# Patient Record
Sex: FEMALE | Race: WHITE | NOT HISPANIC OR LATINO | Employment: OTHER | URBAN - METROPOLITAN AREA
[De-identification: names, ages, dates, MRNs, and addresses within clinical notes are randomized per-mention and may not be internally consistent; named-entity substitution may affect disease eponyms.]

---

## 2024-05-01 ENCOUNTER — OFFICE VISIT (OUTPATIENT)
Dept: FAMILY MEDICINE | Facility: CLINIC | Age: 69
End: 2024-05-01
Payer: MEDICARE

## 2024-05-01 VITALS
SYSTOLIC BLOOD PRESSURE: 110 MMHG | BODY MASS INDEX: 22.26 KG/M2 | OXYGEN SATURATION: 98 % | DIASTOLIC BLOOD PRESSURE: 76 MMHG | HEIGHT: 62 IN | WEIGHT: 121 LBS | HEART RATE: 108 BPM

## 2024-05-01 DIAGNOSIS — J30.2 SEASONAL ALLERGIES: ICD-10-CM

## 2024-05-01 DIAGNOSIS — M25.562 ACUTE PAIN OF LEFT KNEE: ICD-10-CM

## 2024-05-01 DIAGNOSIS — Z11.59 NEED FOR HEPATITIS C SCREENING TEST: ICD-10-CM

## 2024-05-01 DIAGNOSIS — Z11.4 ENCOUNTER FOR SCREENING FOR HIV: ICD-10-CM

## 2024-05-01 DIAGNOSIS — M81.0 OSTEOPOROSIS WITHOUT CURRENT PATHOLOGICAL FRACTURE, UNSPECIFIED OSTEOPOROSIS TYPE: ICD-10-CM

## 2024-05-01 DIAGNOSIS — Z12.11 COLON CANCER SCREENING: ICD-10-CM

## 2024-05-01 DIAGNOSIS — Z72.0 TOBACCO ABUSE: ICD-10-CM

## 2024-05-01 DIAGNOSIS — M54.50 CHRONIC LOW BACK PAIN, UNSPECIFIED BACK PAIN LATERALITY, UNSPECIFIED WHETHER SCIATICA PRESENT: ICD-10-CM

## 2024-05-01 DIAGNOSIS — G89.29 CHRONIC LOW BACK PAIN, UNSPECIFIED BACK PAIN LATERALITY, UNSPECIFIED WHETHER SCIATICA PRESENT: ICD-10-CM

## 2024-05-01 DIAGNOSIS — I10 PRIMARY HYPERTENSION: ICD-10-CM

## 2024-05-01 DIAGNOSIS — R79.89 OTHER SPECIFIED ABNORMAL FINDINGS OF BLOOD CHEMISTRY: ICD-10-CM

## 2024-05-01 DIAGNOSIS — Z76.89 ENCOUNTER TO ESTABLISH CARE: Primary | ICD-10-CM

## 2024-05-01 PROCEDURE — 1125F AMNT PAIN NOTED PAIN PRSNT: CPT | Mod: CPTII,S$GLB,, | Performed by: STUDENT IN AN ORGANIZED HEALTH CARE EDUCATION/TRAINING PROGRAM

## 2024-05-01 PROCEDURE — 3288F FALL RISK ASSESSMENT DOCD: CPT | Mod: CPTII,S$GLB,, | Performed by: STUDENT IN AN ORGANIZED HEALTH CARE EDUCATION/TRAINING PROGRAM

## 2024-05-01 PROCEDURE — 99204 OFFICE O/P NEW MOD 45 MIN: CPT | Mod: S$GLB,,, | Performed by: STUDENT IN AN ORGANIZED HEALTH CARE EDUCATION/TRAINING PROGRAM

## 2024-05-01 PROCEDURE — 3078F DIAST BP <80 MM HG: CPT | Mod: CPTII,S$GLB,, | Performed by: STUDENT IN AN ORGANIZED HEALTH CARE EDUCATION/TRAINING PROGRAM

## 2024-05-01 PROCEDURE — 1101F PT FALLS ASSESS-DOCD LE1/YR: CPT | Mod: CPTII,S$GLB,, | Performed by: STUDENT IN AN ORGANIZED HEALTH CARE EDUCATION/TRAINING PROGRAM

## 2024-05-01 PROCEDURE — 1160F RVW MEDS BY RX/DR IN RCRD: CPT | Mod: CPTII,S$GLB,, | Performed by: STUDENT IN AN ORGANIZED HEALTH CARE EDUCATION/TRAINING PROGRAM

## 2024-05-01 PROCEDURE — 1159F MED LIST DOCD IN RCRD: CPT | Mod: CPTII,S$GLB,, | Performed by: STUDENT IN AN ORGANIZED HEALTH CARE EDUCATION/TRAINING PROGRAM

## 2024-05-01 PROCEDURE — 3008F BODY MASS INDEX DOCD: CPT | Mod: CPTII,S$GLB,, | Performed by: STUDENT IN AN ORGANIZED HEALTH CARE EDUCATION/TRAINING PROGRAM

## 2024-05-01 PROCEDURE — 3074F SYST BP LT 130 MM HG: CPT | Mod: CPTII,S$GLB,, | Performed by: STUDENT IN AN ORGANIZED HEALTH CARE EDUCATION/TRAINING PROGRAM

## 2024-05-01 RX ORDER — VARENICLINE TARTRATE 0.5 (11)-1
KIT ORAL
Qty: 1 EACH | Refills: 0 | Status: SHIPPED | OUTPATIENT
Start: 2024-05-01

## 2024-05-01 RX ORDER — BENZOCAINE .13; .15; .5; 2 G/100G; G/100G; G/100G; G/100G
1 GEL ORAL
COMMUNITY
Start: 2023-11-29

## 2024-05-01 RX ORDER — METOPROLOL SUCCINATE 25 MG/1
25 TABLET, EXTENDED RELEASE ORAL
COMMUNITY

## 2024-05-01 RX ORDER — ALBUTEROL SULFATE 90 UG/1
2 AEROSOL, METERED RESPIRATORY (INHALATION) EVERY 4 HOURS PRN
COMMUNITY
Start: 2024-04-02

## 2024-05-01 RX ORDER — LEVOCETIRIZINE DIHYDROCHLORIDE 5 MG/1
5 TABLET, FILM COATED ORAL
COMMUNITY
Start: 2024-04-14

## 2024-05-01 RX ORDER — IPRATROPIUM BROMIDE 21 UG/1
2 SPRAY, METERED NASAL
COMMUNITY
Start: 2024-04-01

## 2024-05-01 RX ORDER — FLUTICASONE PROPIONATE 50 MCG
2 SPRAY, SUSPENSION (ML) NASAL
COMMUNITY
Start: 2024-01-16

## 2024-05-01 RX ORDER — MELOXICAM 15 MG/1
15 TABLET ORAL DAILY
Qty: 30 TABLET | Refills: 1 | Status: SHIPPED | OUTPATIENT
Start: 2024-05-01 | End: 2024-06-04 | Stop reason: SDUPTHER

## 2024-05-01 RX ORDER — BENZOCAINE .13; .15; .5; 2 G/100G; G/100G; G/100G; G/100G
1 GEL ORAL
Status: CANCELLED | OUTPATIENT
Start: 2024-05-01

## 2024-05-01 RX ORDER — VARENICLINE TARTRATE 1 MG/1
1 TABLET, FILM COATED ORAL 2 TIMES DAILY
Qty: 60 TABLET | Refills: 5 | Status: SHIPPED | OUTPATIENT
Start: 2024-05-01

## 2024-05-01 RX ORDER — ALENDRONATE SODIUM 70 MG/1
70 TABLET ORAL
Qty: 12 TABLET | Refills: 1 | Status: SHIPPED | OUTPATIENT
Start: 2024-05-01 | End: 2024-07-30

## 2024-05-01 RX ORDER — ALENDRONATE SODIUM 70 MG/1
70 TABLET ORAL
COMMUNITY
Start: 2023-11-15 | End: 2024-05-01 | Stop reason: SDUPTHER

## 2024-05-01 RX ORDER — ASPIRIN 81 MG/1
81 TABLET ORAL EVERY MORNING
COMMUNITY

## 2024-05-01 NOTE — PROGRESS NOTES
"  [facility]  [address], [address]  [place] [address]    Subjective     Patient ID: Jing Spears is a 68 y.o. female who comes to the clinic to establish care.    Chief Complaint: Establish Care (Refills on Medications. ), Back Pain (Lower back pain for many  years. No hx of surgery. PT in the past but no relief.), and Knee Pain (Left knee x 3 weeks. No falls or injuries noted.)    Osteoporosis - takes fosfamax    COPD - uses albuterol inhaler nightly, still smokes    HTN - takes metoprolol succinate    Chronic back pain - would like a referral to pain mgmt    ROS negative unless stated above       Objective     Vitals:    05/01/24 1306   BP: 110/76   Pulse: 108   SpO2: 98%   Weight: 54.9 kg (121 lb)   Height: 5' 2" (1.575 m)        Wt Readings from Last 3 Encounters:   05/01/24 1306 54.9 kg (121 lb)        Physical Exam  Vitals reviewed.   Constitutional:       Appearance: Normal appearance.   HENT:      Head: Normocephalic and atraumatic.   Eyes:      Extraocular Movements: Extraocular movements intact.      Pupils: Pupils are equal, round, and reactive to light.   Cardiovascular:      Rate and Rhythm: Normal rate.      Pulses: Normal pulses.      Heart sounds: Normal heart sounds.   Pulmonary:      Effort: Pulmonary effort is normal. No respiratory distress.      Breath sounds: Normal breath sounds.   Musculoskeletal:         General: Normal range of motion.      Cervical back: Normal range of motion and neck supple.   Skin:     General: Skin is dry.      Capillary Refill: Capillary refill takes less than 2 seconds.   Neurological:      General: No focal deficit present.      Mental Status: She is alert and oriented to person, place, and time. Mental status is at baseline.   Psychiatric:         Mood and Affect: Mood normal.         Behavior: Behavior normal.         Thought Content: Thought content normal.         Current Outpatient " Medications   Medication Instructions    albuterol (PROVENTIL/VENTOLIN HFA) 90 mcg/actuation inhaler 2 puffs, Inhalation, Every 4 hours PRN    alendronate (FOSAMAX) 70 mg, Oral, Every 7 days    aspirin (ECOTRIN) 81 mg, Oral, Every morning    budesonide (RINOCORT AQUA) 32 mcg/actuation nasal spray 1 spray, Nasal    fluticasone propionate (FLONASE) 50 mcg/actuation nasal spray 2 sprays, Each Nostril    ipratropium (ATROVENT) 21 mcg (0.03 %) nasal spray 2 sprays, Each Nostril    levocetirizine (XYZAL) 5 mg, Oral    meloxicam (MOBIC) 15 mg, Oral, Daily    metoprolol succinate (TOPROL-XL) 25 mg, Oral    varenicline (CHANTIX STARTING MONTH BOX) 0.5 mg (11)- 1 mg (42) tablet Take one 0.5mg tab by mouth once daily X3 days,then increase to one 0.5mg tab twice daily X4 days,then increase to one 1mg tab twice daily    varenicline (CHANTIX) 1 mg, Oral, 2 times daily           Assessment and Plan     1. Encounter to establish care  -     CBC auto differential; Future; Expected date: 05/01/2024  -     Comprehensive metabolic panel; Future; Expected date: 05/01/2024  -     Lipid panel; Future; Expected date: 05/01/2024    2. Encounter for screening for HIV  -     HIV 1/2 Ag/Ab (4th Gen); Future; Expected date: 05/01/2024    3. Need for hepatitis C screening test  -     Hepatitis C antibody; Future; Expected date: 05/01/2024    4. Seasonal allergies    5. Primary hypertension    6. Osteoporosis without current pathological fracture, unspecified osteoporosis type  -     alendronate (FOSAMAX) 70 MG tablet; Take 1 tablet (70 mg total) by mouth every 7 days.  Dispense: 12 tablet; Refill: 1  -     DXA Bone Density Axial Skeleton 1 or more sites; Future; Expected date: 05/01/2024    7. Chronic low back pain, unspecified back pain laterality, unspecified whether sciatica present  -     Ambulatory referral/consult to Pain Clinic; Future; Expected date: 05/08/2024    8. Colon cancer screening  -     Cologuard Screening (Multitarget Stool  "DNA); Future; Expected date: 05/01/2024    9. Acute pain of left knee  -     meloxicam (MOBIC) 15 MG tablet; Take 1 tablet (15 mg total) by mouth once daily.  Dispense: 30 tablet; Refill: 1    10. Tobacco abuse  -     varenicline (CHANTIX STARTING MONTH BOX) 0.5 mg (11)- 1 mg (42) tablet; Take one 0.5mg tab by mouth once daily X3 days,then increase to one 0.5mg tab twice daily X4 days,then increase to one 1mg tab twice daily  Dispense: 1 each; Refill: 0  -     varenicline (CHANTIX) 1 mg Tab; Take 1 tablet (1 mg total) by mouth 2 (two) times daily.  Dispense: 60 tablet; Refill: 5    11. Other specified abnormal findings of blood chemistry  -     CBC auto differential; Future; Expected date: 05/01/2024  -     Lipid panel; Future; Expected date: 05/01/2024        Here to establish care    For HTN, continue metoprolol, at goal    For osteoporosis, refilling fosfamax    For tobacco use, patient said she doesn't want me to "lecture her about it".  I told her I have to advise her to stop as it is my job and she said understanding.  Is willing to start Chantix.    For chronic back pain, referring to pain management    Ordering [facility]     For breast cancer screening, she said that she does not do mammograms.  I advised her that it would be very smart to have it done despite her previous misdiagnosis and pain during the exam.  She said she would like to discuss it in 1 month    RTC in 1 month, talk about mammogram, get labs         I encouraged the patient to take all medications as prescribed and to keep follow up appointments with their providers. Patient stated they had no other concerns. Questions were invited and answered. Follow up sooner if needed.     Tai Irizarry MD  05/01/2024 1:10 PM      "

## 2024-06-04 ENCOUNTER — OFFICE VISIT (OUTPATIENT)
Dept: FAMILY MEDICINE | Facility: CLINIC | Age: 69
End: 2024-06-04
Payer: MEDICARE

## 2024-06-04 ENCOUNTER — LAB VISIT (OUTPATIENT)
Dept: LAB | Facility: CLINIC | Age: 69
End: 2024-06-04
Payer: MEDICARE

## 2024-06-04 VITALS
RESPIRATION RATE: 18 BRPM | HEIGHT: 62 IN | BODY MASS INDEX: 20.61 KG/M2 | WEIGHT: 112 LBS | HEART RATE: 97 BPM | DIASTOLIC BLOOD PRESSURE: 65 MMHG | OXYGEN SATURATION: 99 % | SYSTOLIC BLOOD PRESSURE: 125 MMHG

## 2024-06-04 DIAGNOSIS — Z11.59 NEED FOR HEPATITIS C SCREENING TEST: ICD-10-CM

## 2024-06-04 DIAGNOSIS — Z12.11 COLON CANCER SCREENING: ICD-10-CM

## 2024-06-04 DIAGNOSIS — Z11.4 ENCOUNTER FOR SCREENING FOR HIV: ICD-10-CM

## 2024-06-04 DIAGNOSIS — M25.562 ACUTE PAIN OF LEFT KNEE: Primary | ICD-10-CM

## 2024-06-04 DIAGNOSIS — Z76.89 ENCOUNTER TO ESTABLISH CARE: ICD-10-CM

## 2024-06-04 DIAGNOSIS — M81.0 OSTEOPOROSIS WITHOUT CURRENT PATHOLOGICAL FRACTURE, UNSPECIFIED OSTEOPOROSIS TYPE: ICD-10-CM

## 2024-06-04 DIAGNOSIS — R79.89 OTHER SPECIFIED ABNORMAL FINDINGS OF BLOOD CHEMISTRY: ICD-10-CM

## 2024-06-04 PROBLEM — J44.9 CHRONIC OBSTRUCTIVE PULMONARY DISEASE, UNSPECIFIED: Status: ACTIVE | Noted: 2024-06-04

## 2024-06-04 PROBLEM — I47.10 SUPRAVENTRICULAR TACHYCARDIA: Status: ACTIVE | Noted: 2024-06-04

## 2024-06-04 LAB
ALBUMIN SERPL BCP-MCNC: 3.5 G/DL (ref 3.5–5.2)
ALP SERPL-CCNC: 139 U/L (ref 55–135)
ALT SERPL W/O P-5'-P-CCNC: 23 U/L (ref 10–44)
ANION GAP SERPL CALC-SCNC: 11 MMOL/L (ref 8–16)
AST SERPL-CCNC: 32 U/L (ref 10–40)
BASOPHILS # BLD AUTO: 0.05 K/UL (ref 0–0.2)
BASOPHILS NFR BLD: 1.1 % (ref 0–1.9)
BILIRUB SERPL-MCNC: 0.6 MG/DL (ref 0.1–1)
BUN SERPL-MCNC: 7 MG/DL (ref 8–23)
CALCIUM SERPL-MCNC: 9.5 MG/DL (ref 8.7–10.5)
CHLORIDE SERPL-SCNC: 96 MMOL/L (ref 95–110)
CHOLEST SERPL-MCNC: 190 MG/DL (ref 120–199)
CHOLEST/HDLC SERPL: 2.1 {RATIO} (ref 2–5)
CO2 SERPL-SCNC: 24 MMOL/L (ref 23–29)
CREAT SERPL-MCNC: 0.9 MG/DL (ref 0.5–1.4)
DIFFERENTIAL METHOD BLD: ABNORMAL
EOSINOPHIL # BLD AUTO: 0.1 K/UL (ref 0–0.5)
EOSINOPHIL NFR BLD: 2 % (ref 0–8)
ERYTHROCYTE [DISTWIDTH] IN BLOOD BY AUTOMATED COUNT: 12.8 % (ref 11.5–14.5)
EST. GFR  (NO RACE VARIABLE): >60 ML/MIN/1.73 M^2
GLUCOSE SERPL-MCNC: 122 MG/DL (ref 70–110)
HCT VFR BLD AUTO: 40.5 % (ref 37–48.5)
HDLC SERPL-MCNC: 90 MG/DL (ref 40–75)
HDLC SERPL: 47.4 % (ref 20–50)
HGB BLD-MCNC: 14.3 G/DL (ref 12–16)
IMM GRANULOCYTES # BLD AUTO: 0.01 K/UL (ref 0–0.04)
IMM GRANULOCYTES NFR BLD AUTO: 0.2 % (ref 0–0.5)
LDLC SERPL CALC-MCNC: 88.8 MG/DL (ref 63–159)
LYMPHOCYTES # BLD AUTO: 1.4 K/UL (ref 1–4.8)
LYMPHOCYTES NFR BLD: 32.2 % (ref 18–48)
MCH RBC QN AUTO: 34.8 PG (ref 27–31)
MCHC RBC AUTO-ENTMCNC: 35.3 G/DL (ref 32–36)
MCV RBC AUTO: 99 FL (ref 82–98)
MONOCYTES # BLD AUTO: 0.5 K/UL (ref 0.3–1)
MONOCYTES NFR BLD: 10.6 % (ref 4–15)
NEUTROPHILS # BLD AUTO: 2.4 K/UL (ref 1.8–7.7)
NEUTROPHILS NFR BLD: 53.9 % (ref 38–73)
NONHDLC SERPL-MCNC: 100 MG/DL
NRBC BLD-RTO: 0 /100 WBC
PLATELET # BLD AUTO: 234 K/UL (ref 150–450)
PMV BLD AUTO: 8.9 FL (ref 9.2–12.9)
POTASSIUM SERPL-SCNC: 3.9 MMOL/L (ref 3.5–5.1)
PROT SERPL-MCNC: 6.8 G/DL (ref 6–8.4)
RBC # BLD AUTO: 4.11 M/UL (ref 4–5.4)
SODIUM SERPL-SCNC: 131 MMOL/L (ref 136–145)
TRIGL SERPL-MCNC: 56 MG/DL (ref 30–150)
WBC # BLD AUTO: 4.44 K/UL (ref 3.9–12.7)

## 2024-06-04 PROCEDURE — 86803 HEPATITIS C AB TEST: CPT | Performed by: STUDENT IN AN ORGANIZED HEALTH CARE EDUCATION/TRAINING PROGRAM

## 2024-06-04 PROCEDURE — 1101F PT FALLS ASSESS-DOCD LE1/YR: CPT | Mod: CPTII,S$GLB,, | Performed by: STUDENT IN AN ORGANIZED HEALTH CARE EDUCATION/TRAINING PROGRAM

## 2024-06-04 PROCEDURE — 87389 HIV-1 AG W/HIV-1&-2 AB AG IA: CPT | Performed by: STUDENT IN AN ORGANIZED HEALTH CARE EDUCATION/TRAINING PROGRAM

## 2024-06-04 PROCEDURE — 1159F MED LIST DOCD IN RCRD: CPT | Mod: CPTII,S$GLB,, | Performed by: STUDENT IN AN ORGANIZED HEALTH CARE EDUCATION/TRAINING PROGRAM

## 2024-06-04 PROCEDURE — 36415 COLL VENOUS BLD VENIPUNCTURE: CPT | Mod: ,,, | Performed by: STUDENT IN AN ORGANIZED HEALTH CARE EDUCATION/TRAINING PROGRAM

## 2024-06-04 PROCEDURE — 3078F DIAST BP <80 MM HG: CPT | Mod: CPTII,S$GLB,, | Performed by: STUDENT IN AN ORGANIZED HEALTH CARE EDUCATION/TRAINING PROGRAM

## 2024-06-04 PROCEDURE — 85025 COMPLETE CBC W/AUTO DIFF WBC: CPT | Performed by: STUDENT IN AN ORGANIZED HEALTH CARE EDUCATION/TRAINING PROGRAM

## 2024-06-04 PROCEDURE — 3288F FALL RISK ASSESSMENT DOCD: CPT | Mod: CPTII,S$GLB,, | Performed by: STUDENT IN AN ORGANIZED HEALTH CARE EDUCATION/TRAINING PROGRAM

## 2024-06-04 PROCEDURE — 3074F SYST BP LT 130 MM HG: CPT | Mod: CPTII,S$GLB,, | Performed by: STUDENT IN AN ORGANIZED HEALTH CARE EDUCATION/TRAINING PROGRAM

## 2024-06-04 PROCEDURE — 80061 LIPID PANEL: CPT | Performed by: STUDENT IN AN ORGANIZED HEALTH CARE EDUCATION/TRAINING PROGRAM

## 2024-06-04 PROCEDURE — 3008F BODY MASS INDEX DOCD: CPT | Mod: CPTII,S$GLB,, | Performed by: STUDENT IN AN ORGANIZED HEALTH CARE EDUCATION/TRAINING PROGRAM

## 2024-06-04 PROCEDURE — 80053 COMPREHEN METABOLIC PANEL: CPT | Performed by: STUDENT IN AN ORGANIZED HEALTH CARE EDUCATION/TRAINING PROGRAM

## 2024-06-04 PROCEDURE — 1125F AMNT PAIN NOTED PAIN PRSNT: CPT | Mod: CPTII,S$GLB,, | Performed by: STUDENT IN AN ORGANIZED HEALTH CARE EDUCATION/TRAINING PROGRAM

## 2024-06-04 PROCEDURE — 99214 OFFICE O/P EST MOD 30 MIN: CPT | Mod: S$GLB,,, | Performed by: STUDENT IN AN ORGANIZED HEALTH CARE EDUCATION/TRAINING PROGRAM

## 2024-06-04 RX ORDER — MELOXICAM 15 MG/1
15 TABLET ORAL DAILY
Qty: 30 TABLET | Refills: 1 | Status: SHIPPED | OUTPATIENT
Start: 2024-06-04

## 2024-06-04 NOTE — PROGRESS NOTES
"  [facility]  [address], [address]  [place] [address]    Subjective     Patient ID: Jing Spears is a 68 y.o. female who comes to the clinic for a follow up visit.    Chief Complaint: Follow-up    Osteoporosis - takes fosfamax     COPD - uses albuterol inhaler nightly, still smokes     HTN - takes metoprolol succinate     Chronic back pain - would like a referral to pain mgmt    Tobacco abuse - smokes at least 1PPD, starting chantix today    ROS negative unless stated above       Objective     Vitals:    06/04/24 1106   BP: 125/65   BP Location: Left arm   Patient Position: Sitting   BP Method: Medium (Manual)   Pulse: 97   Resp: 18   SpO2: 99%   Weight: 50.8 kg (112 lb)   Height: 5' 2" (1.575 m)        Wt Readings from Last 3 Encounters:   06/04/24 1106 50.8 kg (112 lb)   05/01/24 1306 54.9 kg (121 lb)        Physical Exam  Vitals reviewed.   Constitutional:       Appearance: Normal appearance.   HENT:      Head: Normocephalic and atraumatic.   Eyes:      Extraocular Movements: Extraocular movements intact.      Pupils: Pupils are equal, round, and reactive to light.   Cardiovascular:      Rate and Rhythm: Normal rate.   Pulmonary:      Effort: Pulmonary effort is normal. No respiratory distress.   Musculoskeletal:         General: Normal range of motion.      Cervical back: Normal range of motion and neck supple.   Skin:     General: Skin is dry.      Capillary Refill: Capillary refill takes less than 2 seconds.   Neurological:      General: No focal deficit present.      Mental Status: She is alert and oriented to person, place, and time. Mental status is at baseline.   Psychiatric:         Mood and Affect: Mood normal.         Behavior: Behavior normal.         Thought Content: Thought content normal.         Current Outpatient Medications   Medication Instructions    albuterol (PROVENTIL/VENTOLIN HFA) 90 mcg/actuation inhaler 2 puffs, " "Inhalation, Every 4 hours PRN    alendronate (FOSAMAX) 70 mg, Oral, Every 7 days    aspirin (ECOTRIN) 81 mg, Oral, Every morning    budesonide (RINOCORT AQUA) 32 mcg/actuation nasal spray 1 spray, Nasal    fluticasone propionate (FLONASE) 50 mcg/actuation nasal spray 2 sprays, Each Nostril    ipratropium (ATROVENT) 21 mcg (0.03 %) nasal spray 2 sprays, Each Nostril    levocetirizine (XYZAL) 5 mg, Oral    meloxicam (MOBIC) 15 mg, Oral, Daily    metoprolol succinate (TOPROL-XL) 25 mg, Oral    varenicline (CHANTIX STARTING MONTH BOX) 0.5 mg (11)- 1 mg (42) tablet Take one 0.5mg tab by mouth once daily X3 days,then increase to one 0.5mg tab twice daily X4 days,then increase to one 1mg tab twice daily    varenicline (CHANTIX) 1 mg, Oral, 2 times daily           Assessment and Plan     1. Acute pain of left knee  -     X-Ray Knee 3 View Left; Future; Expected date: 06/04/2024  -     meloxicam (MOBIC) 15 MG tablet; Take 1 tablet (15 mg total) by mouth once daily.  Dispense: 30 tablet; Refill: 1    2. Osteoporosis without current pathological fracture, unspecified osteoporosis type  -     DXA Bone Density Axial Skeleton 1 or more sites; Future; Expected date: 06/04/2024    3. Colon cancer screening  -     Ambulatory referral/consult to Gastroenterology; Future; Expected date: 06/11/2024        Here for follow up    We agreed to get a colonoscopy (cologuard was "pinching her nerves"), she will answer their number and set up an appt    Declined mammo again but at least seemed somewhat more agreeable to it, will discuss at 2 mo f/u    Ordering xray of knee, continue mobic, stop taking naproxyn with this, may get MRI if xray is unremarkable. Will get this and DXA done on same day at [facility]    RTC in 2 months, mammo then         I encouraged the patient to take all medications as prescribed and to keep follow up appointments with their providers. Patient stated they had no other concerns. Questions were invited and answered. " Follow up sooner if needed.     Tai Irizarry MD  06/04/2024 11:11 AM

## 2024-06-05 LAB
HCV AB SERPL QL IA: NORMAL
HIV 1+2 AB+HIV1 P24 AG SERPL QL IA: NORMAL

## 2024-06-07 ENCOUNTER — TELEPHONE (OUTPATIENT)
Dept: FAMILY MEDICINE | Facility: CLINIC | Age: 69
End: 2024-06-07
Payer: MEDICARE

## 2024-06-07 NOTE — TELEPHONE ENCOUNTER
----- Message from Rhoda Moran sent at 6/7/2024  3:31 PM CDT -----  Contact: PT  Type:  Needs Medical Advice    Who Called: PT   Symptoms (please be specific): PLEASE FWD BONE DENSITY AND  X RAY  ORDERS TO [facility]   How long has patient had these symptoms:  N/A  Pharmacy name and phone #:  N/A  Would the patient rather a call back or a response via [facility]? CALL   Best Call Back Number: [phone] (home)   Additional Information: THANK YOU

## 2024-06-14 ENCOUNTER — TELEPHONE (OUTPATIENT)
Dept: FAMILY MEDICINE | Facility: CLINIC | Age: 69
End: 2024-06-14
Payer: MEDICARE

## 2024-06-14 NOTE — TELEPHONE ENCOUNTER
Awaiting results, pt aware.      ----- Message from Melba Gibbons sent at 6/14/2024 12:10 PM CDT -----  Contact: Patient  Type:  Test Results    Who Called: Patient     Name of Test (Lab/Mammo/Etc): knee x ray     Date of Test: yesterday/ 06/13/24    Ordering Provider: Stephany    Where the test was performed: [facility]     Would the patient rather a call back or a response via [facility]? Call    Best Call Back Number: [phone] (home)     Additional Information:  Please call to advise

## 2024-06-17 ENCOUNTER — TELEPHONE (OUTPATIENT)
Dept: FAMILY MEDICINE | Facility: CLINIC | Age: 69
End: 2024-06-17
Payer: MEDICARE

## 2024-06-17 NOTE — TELEPHONE ENCOUNTER
Call placed to pt due to 's orders to let the pt know that nothing acute was seen, no fracture or arthritis but she does need OTC calcium and Vit D due to some age related bone loss. Pt request appt due/diff walking. Sched appt 6/21/2024 at 1130 AM with Dr. Irizarry.      Tai Irizarry MD Rashid, Carlinda, LPN  Caller: Unspecified (Today, 11:55 AM)  Please let her know that nothing acute was seen, no fracture or arthritis. She does have some age related bone loss, recommend calcium and vitamin D OTC        Hello ,  Please review this message below from this patient concerning x-ray of the left Knee.   Call placed to pt due to msg left, spoke w/pt states calling for xray results of the left knee. Reviewed chart noted updated results in Care Everywhere. Informed pt I will send a msg to Dr. Irizarry on this matter.   Last office visit: 6/4/2024  Thank you.  Signed:  Ken Georges LPN      ----- Message from Rhoda Moran sent at 6/17/2024 11:47 AM CDT -----  Contact: PT  Type:  Test Results    Who Called: PT   Name of Test (Lab/Mammo/Etc):  LEFT KNEE XRAY   Date of Test: 06/13/24  Ordering Provider: JASON   Where the test was performed:  [facility]   Would the patient rather a call back or a response via [facility]? CALL   Best Call Back Number:  [phone] (home)   Additional Information:  THANK YOU

## 2024-07-30 ENCOUNTER — PATIENT OUTREACH (OUTPATIENT)
Dept: ADMINISTRATIVE | Facility: HOSPITAL | Age: 69
End: 2024-07-30
Payer: MEDICARE

## 2024-07-30 DIAGNOSIS — Z12.31 ENCOUNTER FOR SCREENING MAMMOGRAM FOR BREAST CANCER: Primary | ICD-10-CM

## 2024-09-05 ENCOUNTER — TELEPHONE (OUTPATIENT)
Dept: FAMILY MEDICINE | Facility: CLINIC | Age: 69
End: 2024-09-05
Payer: MEDICARE

## 2024-09-05 NOTE — TELEPHONE ENCOUNTER
----- Message from Stephanie Diggs sent at 9/5/2024  1:09 PM CDT -----  Contact: pt  Type: Needs Medical Advice         Who Called: pt  Best Call Back Number:[phone]    Additional Information: Requesting a call back regarding  pt is asking for a certain time and day for an appt.  Any Thursday @ 11.  Pt declined all other options     Please Advise- Thank you

## 2024-09-05 NOTE — TELEPHONE ENCOUNTER
----- Message from Bobo Rico sent at 9/4/2024  2:16 PM CDT -----  Regarding: appointment  Contact: patient  Type:  Patient Returning Call    Who Called:patient  Who Left Message for Patient:office  Does the patient know what this is regarding?:only wants appointment on a Wednesday or Thursday and it has to be at 11:30 only  Would the patient rather a call back or a response via [facility]? Please call to schedule  Best Call Back Number:[phone]  Additional Information: patient would not take any other appointment from call center

## 2024-09-23 ENCOUNTER — TELEPHONE (OUTPATIENT)
Dept: FAMILY MEDICINE | Facility: CLINIC | Age: 69
End: 2024-09-23
Payer: MEDICARE

## 2024-09-23 NOTE — TELEPHONE ENCOUNTER
----- Message from Flaca Kimmyclark sent at 9/23/2024  3:52 PM CDT -----  Regarding: returning call  Contact: pt  Type: Needs Medical Advice  Who Called:  patient  Symptoms (please be specific):    How long has patient had these symptoms:    Pharmacy name and phone #:    Best Call Back Number: [phone]  Additional Information: stella del castillo pt is returning ur call are u available MRN: [id number] RY FELIPE

## 2024-09-23 NOTE — TELEPHONE ENCOUNTER
----- Message from Leigh Stark MA sent at 9/23/2024  2:47 PM CDT -----  Regarding: FW: return call  Contact: patient    ----- Message -----  From: Bobo Rico  Sent: 9/23/2024   2:15 PM CDT  To: Juan C Lujan Staff  Subject: return call                                      Type:  Patient Returning Call    Who Called:patient  Who Left Message for Patient:Ken PACE  Does the patient know what this is regarding?:please call  Would the patient rather a call back or a response via [facility]? No info given  Best Call Back Number:[phone]  Additional Information:

## 2024-10-02 ENCOUNTER — OFFICE VISIT (OUTPATIENT)
Dept: FAMILY MEDICINE | Facility: CLINIC | Age: 69
End: 2024-10-02
Payer: MEDICARE

## 2024-10-02 VITALS
WEIGHT: 118 LBS | RESPIRATION RATE: 20 BRPM | HEIGHT: 62 IN | DIASTOLIC BLOOD PRESSURE: 78 MMHG | OXYGEN SATURATION: 94 % | SYSTOLIC BLOOD PRESSURE: 127 MMHG | HEART RATE: 94 BPM | BODY MASS INDEX: 21.71 KG/M2

## 2024-10-02 DIAGNOSIS — Z23 NEEDS FLU SHOT: ICD-10-CM

## 2024-10-02 DIAGNOSIS — Z12.11 COLON CANCER SCREENING: ICD-10-CM

## 2024-10-02 DIAGNOSIS — M25.562 ACUTE PAIN OF LEFT KNEE: ICD-10-CM

## 2024-10-02 DIAGNOSIS — G89.29 CHRONIC BACK PAIN, UNSPECIFIED BACK LOCATION, UNSPECIFIED BACK PAIN LATERALITY: Primary | ICD-10-CM

## 2024-10-02 DIAGNOSIS — M54.9 CHRONIC BACK PAIN, UNSPECIFIED BACK LOCATION, UNSPECIFIED BACK PAIN LATERALITY: Primary | ICD-10-CM

## 2024-10-02 DIAGNOSIS — M81.0 OSTEOPOROSIS WITHOUT CURRENT PATHOLOGICAL FRACTURE, UNSPECIFIED OSTEOPOROSIS TYPE: ICD-10-CM

## 2024-10-02 PROBLEM — I47.10 SUPRAVENTRICULAR TACHYCARDIA: Status: RESOLVED | Noted: 2024-06-04 | Resolved: 2024-10-02

## 2024-10-02 PROCEDURE — 3074F SYST BP LT 130 MM HG: CPT | Mod: CPTII,S$GLB,, | Performed by: STUDENT IN AN ORGANIZED HEALTH CARE EDUCATION/TRAINING PROGRAM

## 2024-10-02 PROCEDURE — 3078F DIAST BP <80 MM HG: CPT | Mod: CPTII,S$GLB,, | Performed by: STUDENT IN AN ORGANIZED HEALTH CARE EDUCATION/TRAINING PROGRAM

## 2024-10-02 PROCEDURE — 1160F RVW MEDS BY RX/DR IN RCRD: CPT | Mod: CPTII,S$GLB,, | Performed by: STUDENT IN AN ORGANIZED HEALTH CARE EDUCATION/TRAINING PROGRAM

## 2024-10-02 PROCEDURE — 99214 OFFICE O/P EST MOD 30 MIN: CPT | Mod: S$GLB,,, | Performed by: STUDENT IN AN ORGANIZED HEALTH CARE EDUCATION/TRAINING PROGRAM

## 2024-10-02 PROCEDURE — G2211 COMPLEX E/M VISIT ADD ON: HCPCS | Mod: S$GLB,,, | Performed by: STUDENT IN AN ORGANIZED HEALTH CARE EDUCATION/TRAINING PROGRAM

## 2024-10-02 PROCEDURE — 1101F PT FALLS ASSESS-DOCD LE1/YR: CPT | Mod: CPTII,S$GLB,, | Performed by: STUDENT IN AN ORGANIZED HEALTH CARE EDUCATION/TRAINING PROGRAM

## 2024-10-02 PROCEDURE — G0008 ADMIN INFLUENZA VIRUS VAC: HCPCS | Mod: S$GLB,,, | Performed by: STUDENT IN AN ORGANIZED HEALTH CARE EDUCATION/TRAINING PROGRAM

## 2024-10-02 PROCEDURE — 1159F MED LIST DOCD IN RCRD: CPT | Mod: CPTII,S$GLB,, | Performed by: STUDENT IN AN ORGANIZED HEALTH CARE EDUCATION/TRAINING PROGRAM

## 2024-10-02 PROCEDURE — 3288F FALL RISK ASSESSMENT DOCD: CPT | Mod: CPTII,S$GLB,, | Performed by: STUDENT IN AN ORGANIZED HEALTH CARE EDUCATION/TRAINING PROGRAM

## 2024-10-02 PROCEDURE — 90653 IIV ADJUVANT VACCINE IM: CPT | Mod: S$GLB,,, | Performed by: STUDENT IN AN ORGANIZED HEALTH CARE EDUCATION/TRAINING PROGRAM

## 2024-10-02 PROCEDURE — 1125F AMNT PAIN NOTED PAIN PRSNT: CPT | Mod: CPTII,S$GLB,, | Performed by: STUDENT IN AN ORGANIZED HEALTH CARE EDUCATION/TRAINING PROGRAM

## 2024-10-02 PROCEDURE — 3008F BODY MASS INDEX DOCD: CPT | Mod: CPTII,S$GLB,, | Performed by: STUDENT IN AN ORGANIZED HEALTH CARE EDUCATION/TRAINING PROGRAM

## 2024-10-02 RX ORDER — ALENDRONATE SODIUM 70 MG/1
70 TABLET ORAL
Qty: 12 TABLET | Refills: 1 | Status: SHIPPED | OUTPATIENT
Start: 2024-10-02 | End: 2024-12-31

## 2024-10-02 RX ORDER — MELOXICAM 15 MG/1
15 TABLET ORAL DAILY
Qty: 30 TABLET | Refills: 1 | Status: SHIPPED | OUTPATIENT
Start: 2024-10-02

## 2024-10-02 RX ORDER — CYCLOBENZAPRINE HCL 10 MG
10 TABLET ORAL 3 TIMES DAILY PRN
Qty: 45 TABLET | Refills: 2 | Status: SHIPPED | OUTPATIENT
Start: 2024-10-02 | End: 2024-10-12

## 2024-10-02 NOTE — PROGRESS NOTES
"  [facility]    [facility]  [address], [address]  [place] [address]    Subjective     Patient ID: Jing Spears is a 68 y.o. female who comes to the clinic for an acute visit.    Chief Complaint: Back Pain (Back pain midline to sacral area)    Back pain - needs refills on mobic, and flexeril    Osteoporosis - takes fosfamax     Incontinence - has urinary incontinence    ROS negative unless stated above       Objective     Vitals:    10/02/24 1138   BP: 127/78   BP Location: Left arm   Patient Position: Sitting   Pulse: 94   Resp: 20   SpO2: (!) 94%   Weight: 53.5 kg (118 lb)   Height: 5' 2" (1.575 m)       Wt Readings from Last 3 Encounters:   10/02/24 1138 53.5 kg (118 lb)   06/04/24 1106 50.8 kg (112 lb)   05/01/24 1306 54.9 kg (121 lb)        Physical Exam  Constitutional:       General: She is not in acute distress.     Appearance: Normal appearance. She is not ill-appearing.   HENT:      Head: Normocephalic and atraumatic.      Mouth/Throat:      Mouth: Mucous membranes are moist.   Eyes:      Extraocular Movements: Extraocular movements intact.      Pupils: Pupils are equal, round, and reactive to light.   Cardiovascular:      Rate and Rhythm: Normal rate.   Pulmonary:      Effort: Pulmonary effort is normal. No respiratory distress.   Musculoskeletal:         General: Normal range of motion.      Cervical back: Normal range of motion and neck supple.   Skin:     General: Skin is warm and dry.   Neurological:      General: No focal deficit present.      Mental Status: She is alert and oriented to person, place, and time. Mental status is at baseline.   Psychiatric:         Mood and Affect: Mood normal.         Behavior: Behavior normal.         Thought Content: Thought content normal.         Current Outpatient Medications   Medication Instructions    albuterol (PROVENTIL/VENTOLIN HFA) 90 mcg/actuation inhaler 2 puffs, Every 4 hours PRN    alendronate " (FOSAMAX) 70 mg, Oral, Every 7 days    aspirin (ECOTRIN) 81 mg, Every morning    budesonide (RINOCORT AQUA) 32 mcg/actuation nasal spray 1 spray    cyclobenzaprine (FLEXERIL) 10 mg, Oral, 3 times daily PRN    fluticasone propionate (FLONASE) 50 mcg/actuation nasal spray 2 sprays    ipratropium (ATROVENT) 21 mcg (0.03 %) nasal spray 2 sprays    levocetirizine (XYZAL) 5 mg    meloxicam (MOBIC) 15 mg, Oral, Daily    metoprolol succinate (TOPROL-XL) 25 mg           Assessment and Plan     1. Chronic back pain, unspecified back location, unspecified back pain laterality  -     Ambulatory referral/consult to Pain Clinic; Future; Expected date: 10/09/2024  -     cyclobenzaprine (FLEXERIL) 10 MG tablet; Take 1 tablet (10 mg total) by mouth 3 (three) times daily as needed for Muscle spasms.  Dispense: 45 tablet; Refill: 2  -     meloxicam (MOBIC) 15 MG tablet; Take 1 tablet (15 mg total) by mouth once daily.  Dispense: 30 tablet; Refill: 1    2. Needs flu shot  -     influenza (adjuvanted) (Fluad) 45 mcg/0.5 mL IM vaccine (> or = 64 yo) 0.5 mL    3. Osteoporosis without current pathological fracture, unspecified osteoporosis type  -     alendronate (FOSAMAX) 70 MG tablet; Take 1 tablet (70 mg total) by mouth every 7 days.  Dispense: 12 tablet; Refill: 1    4. Acute pain of left knee    5. Colon cancer screening  -     Fecal Immunochemical Test (iFOBT); Future; Expected date: 10/02/2024        Here for an acute visit    Patient brought her Cologuard to the visit to show me how she can not use it because the lid hurts her bottom parts.  We will try fit kit instead     For osteoporosis, continue Fosamax     For back pain, referring to pain management, continue Mobic, whenever side effects, no other NSAIDs with this, writing Flexeril, whenever side effects    Patient again declined mammogram    Visit today included increased complexity associated with the care of the episodic problem osteoporosis addressed and managing the  longitudinal care of the patient due to the serious and/or complex managed problem(s)     RTC in 3 months, FITKIT should be completed    I encouraged the patient to take all medications as prescribed and to keep follow up appointments with their providers. ED precautions given more concerning issues. Questions were invited and answered. Patient stated they had no other concerns. Follow up sooner if needed.     Tai Irizarry MD  10/02/2024 11:46 AM

## 2024-10-22 ENCOUNTER — LAB VISIT (OUTPATIENT)
Dept: LAB | Facility: HOSPITAL | Age: 69
End: 2024-10-22
Attending: STUDENT IN AN ORGANIZED HEALTH CARE EDUCATION/TRAINING PROGRAM
Payer: MEDICARE

## 2024-10-22 DIAGNOSIS — Z12.11 COLON CANCER SCREENING: ICD-10-CM

## 2024-10-22 LAB — HEMOCCULT STL QL IA: NEGATIVE

## 2024-10-22 PROCEDURE — 82274 ASSAY TEST FOR BLOOD FECAL: CPT | Performed by: STUDENT IN AN ORGANIZED HEALTH CARE EDUCATION/TRAINING PROGRAM

## 2024-11-04 ENCOUNTER — TELEPHONE (OUTPATIENT)
Dept: FAMILY MEDICINE | Facility: CLINIC | Age: 69
End: 2024-11-04
Payer: MEDICARE

## 2024-11-04 DIAGNOSIS — G89.29 CHRONIC BACK PAIN, UNSPECIFIED BACK LOCATION, UNSPECIFIED BACK PAIN LATERALITY: Primary | ICD-10-CM

## 2024-11-04 DIAGNOSIS — M54.9 CHRONIC BACK PAIN, UNSPECIFIED BACK LOCATION, UNSPECIFIED BACK PAIN LATERALITY: Primary | ICD-10-CM

## 2024-11-04 RX ORDER — TIZANIDINE 2 MG/1
4 TABLET ORAL EVERY 8 HOURS PRN
Qty: 60 TABLET | Refills: 3 | Status: SHIPPED | OUTPATIENT
Start: 2024-11-04 | End: 2024-11-14

## 2024-11-04 NOTE — TELEPHONE ENCOUNTER
Angela Gaspar,  Please review message below from this patient concerning Flexeril Rx causing the euphoric feeling and states unable to take. Is requesting another Rx for muscle relaxant. Requesting Pain Management referral be sent to either Dr. Jacob Comer or Dr. Lundberg whose office is located in the [facility] [address].  Please review and advise.  Last office visit: 10/02/2024  Thank you.  Signed:  Ken Georges LPN    ----- Message from Melba sent at 11/4/2024 12:22 PM CST -----  Contact: Patient  Type:  Needs Medical Advice    Who Called: Patient      Would the patient rather a call back or a response via [facility]? Call    Best Call Back Number: [phone]      Additional Information: Patient is requesting a call from the nurse.Please advise

## 2025-01-29 ENCOUNTER — OFFICE VISIT (OUTPATIENT)
Dept: FAMILY MEDICINE | Facility: CLINIC | Age: 70
End: 2025-01-29
Payer: MEDICARE

## 2025-01-29 VITALS
WEIGHT: 116 LBS | HEART RATE: 88 BPM | HEIGHT: 62 IN | SYSTOLIC BLOOD PRESSURE: 127 MMHG | BODY MASS INDEX: 21.35 KG/M2 | OXYGEN SATURATION: 98 % | RESPIRATION RATE: 22 BRPM | DIASTOLIC BLOOD PRESSURE: 71 MMHG

## 2025-01-29 DIAGNOSIS — I10 PRIMARY HYPERTENSION: Primary | ICD-10-CM

## 2025-01-29 DIAGNOSIS — J43.2 CENTRILOBULAR EMPHYSEMA: ICD-10-CM

## 2025-01-29 PROCEDURE — 3008F BODY MASS INDEX DOCD: CPT | Mod: CPTII,S$GLB,, | Performed by: STUDENT IN AN ORGANIZED HEALTH CARE EDUCATION/TRAINING PROGRAM

## 2025-01-29 PROCEDURE — 1125F AMNT PAIN NOTED PAIN PRSNT: CPT | Mod: CPTII,S$GLB,, | Performed by: STUDENT IN AN ORGANIZED HEALTH CARE EDUCATION/TRAINING PROGRAM

## 2025-01-29 PROCEDURE — 3078F DIAST BP <80 MM HG: CPT | Mod: CPTII,S$GLB,, | Performed by: STUDENT IN AN ORGANIZED HEALTH CARE EDUCATION/TRAINING PROGRAM

## 2025-01-29 PROCEDURE — 1101F PT FALLS ASSESS-DOCD LE1/YR: CPT | Mod: CPTII,S$GLB,, | Performed by: STUDENT IN AN ORGANIZED HEALTH CARE EDUCATION/TRAINING PROGRAM

## 2025-01-29 PROCEDURE — 1159F MED LIST DOCD IN RCRD: CPT | Mod: CPTII,S$GLB,, | Performed by: STUDENT IN AN ORGANIZED HEALTH CARE EDUCATION/TRAINING PROGRAM

## 2025-01-29 PROCEDURE — 99214 OFFICE O/P EST MOD 30 MIN: CPT | Mod: S$GLB,,, | Performed by: STUDENT IN AN ORGANIZED HEALTH CARE EDUCATION/TRAINING PROGRAM

## 2025-01-29 PROCEDURE — 3288F FALL RISK ASSESSMENT DOCD: CPT | Mod: CPTII,S$GLB,, | Performed by: STUDENT IN AN ORGANIZED HEALTH CARE EDUCATION/TRAINING PROGRAM

## 2025-01-29 PROCEDURE — G2211 COMPLEX E/M VISIT ADD ON: HCPCS | Mod: S$GLB,,, | Performed by: STUDENT IN AN ORGANIZED HEALTH CARE EDUCATION/TRAINING PROGRAM

## 2025-01-29 PROCEDURE — 1160F RVW MEDS BY RX/DR IN RCRD: CPT | Mod: CPTII,S$GLB,, | Performed by: STUDENT IN AN ORGANIZED HEALTH CARE EDUCATION/TRAINING PROGRAM

## 2025-01-29 PROCEDURE — 3074F SYST BP LT 130 MM HG: CPT | Mod: CPTII,S$GLB,, | Performed by: STUDENT IN AN ORGANIZED HEALTH CARE EDUCATION/TRAINING PROGRAM

## 2025-01-29 RX ORDER — TIZANIDINE 2 MG/1
TABLET ORAL
COMMUNITY
Start: 2025-01-06

## 2025-01-29 NOTE — PROGRESS NOTES
"  [facility]    [facility]  [address]  [place] [address]    Subjective     Patient ID: Jing Spears is a 69 y.o. female who comes to the clinic for a follow up visit.    Chief Complaint: Managament of chronic condition (3 mos f/u)    Osteoporosis - takes fosfamax     COPD - uses albuterol inhaler nightly, still smokes     HTN - takes metoprolol succinate     Chronic back pain - takes OTC medication to control this     Tobacco abuse - smokes at least 1PPD    ROS negative unless stated above       Objective     Vitals:    01/29/25 1131   BP: 127/71   BP Location: Left arm   Pulse: 88   Resp: (!) 22   SpO2: 98%   Weight: 52.6 kg (116 lb)   Height: 5' 2" (1.575 m)        Wt Readings from Last 3 Encounters:   01/29/25 1131 52.6 kg (116 lb)   10/02/24 1138 53.5 kg (118 lb)   06/04/24 1106 50.8 kg (112 lb)        Physical Exam  Constitutional:       General: She is not in acute distress.     Appearance: Normal appearance. She is not ill-appearing.   HENT:      Head: Normocephalic and atraumatic.      Mouth/Throat:      Mouth: Mucous membranes are moist.   Eyes:      Extraocular Movements: Extraocular movements intact.      Pupils: Pupils are equal, round, and reactive to light.   Cardiovascular:      Rate and Rhythm: Normal rate.   Pulmonary:      Effort: Pulmonary effort is normal. No respiratory distress.   Musculoskeletal:         General: Normal range of motion.      Cervical back: Normal range of motion and neck supple.   Skin:     General: Skin is warm and dry.   Neurological:      General: No focal deficit present.      Mental Status: She is alert and oriented to person, place, and time. Mental status is at baseline.   Psychiatric:         Mood and Affect: Mood normal.         Behavior: Behavior normal.         Thought Content: Thought content normal.         Current Outpatient Medications   Medication Instructions    albuterol (PROVENTIL/VENTOLIN " HFA) 90 mcg/actuation inhaler 2 puffs, Every 4 hours PRN    alendronate (FOSAMAX) 70 mg, Oral, Every 7 days    aspirin (ECOTRIN) 81 mg, Every morning    budesonide (RINOCORT AQUA) 32 mcg/actuation nasal spray 1 spray    fluticasone propionate (FLONASE) 50 mcg/actuation nasal spray 2 sprays    ipratropium (ATROVENT) 21 mcg (0.03 %) nasal spray 2 sprays    levocetirizine (XYZAL) 5 mg    meloxicam (MOBIC) 15 mg, Oral, Daily    metoprolol succinate (TOPROL-XL) 25 mg    tiZANidine (ZANAFLEX) 2 MG tablet Take by mouth.           Assessment and Plan     1. Primary hypertension    2. Centrilobular emphysema  Overview:  Noted by [facility]          Here for follow up.    Spent several minutes discussing the need for mammogram, but she is close to getting 1, lists several different reasons for not want anyone but mainly the pain that comes with a mammogram.  We will readdress the issue in 2 months    For hypertension, at goal, continue metoprolol    For COPD, continue albuterol, encouraged smoking cessation    Applauded patient for doing her fit kit    The visit today included increased complexity associated with the care of an episodic problem, namely HTN, that was addressed and managed via longitudinal care.    RTC in 2 months, idalia FELIX encouraged the patient to take all medications as prescribed and to keep follow up appointments with their providers. ED precautions given for more concerning issues. Questions were invited and answered. Patient stated they had no other concerns. Follow up sooner if needed.     Tai Irizarry MD  01/29/2025 11:35 AM

## 2025-02-10 ENCOUNTER — TELEPHONE (OUTPATIENT)
Dept: FAMILY MEDICINE | Facility: CLINIC | Age: 70
End: 2025-02-10
Payer: MEDICARE

## 2025-02-10 NOTE — TELEPHONE ENCOUNTER
Called patient rescheduled her appointment per her request up 2 weeks now scheduled March 12 @ 3857

## 2025-02-10 NOTE — TELEPHONE ENCOUNTER
----- Message from Chanell sent at 2/10/2025  2:05 PM CST -----  Type: Needs Medical Advice  Who Called:  patient   Symptoms (please be specific):    How long has patient had these symptoms:    Pharmacy name and phone #:    Best Call Back Number:  [phone]  Additional Information: pt is requesting a call back to to reschedule her appt on 3/28

## 2025-03-05 RX ORDER — TIZANIDINE 2 MG/1
2 TABLET ORAL EVERY 8 HOURS
Qty: 60 TABLET | Refills: 1 | Status: SHIPPED | OUTPATIENT
Start: 2025-03-05

## 2025-03-05 NOTE — TELEPHONE ENCOUNTER
Angela Irizarry,  Please review this Rx refill request for this patient for Zanaflex 2 mg.  Last office visit: 1/29/2025  Thank you.  Signed:  Ken Georges LPN  ----- Message from Arlene sent at 3/5/2025  2:25 PM CST -----  Type:  RX Refill RequestWho Called: ptRefill or New Rx: Disp Refills Start End tiZANidine (ZANAFLEX) 2 MG tablet - - 1/6/2025 - Sig - Route: Take by mouth. - Oral Class: His RX Name and Strength:How is the patient currently taking it? (ex. 1XDay): see aboveIs this a 30 day or 90 day RX:see abovePreferred Pharmacy with phone number: [id number] [place] [facility] [id number] [facility] [place] [address]Vflgk: [phone] Fax: [phone] or Mail Order: localOrdering Provider:Areliould the patient rather a call back or a response via [facility]? [place] Call Back Number:[phone] Additional Information:  Please call back to advise. Thank you!

## 2025-03-05 NOTE — TELEPHONE ENCOUNTER
Care Due:                  Date            Visit Type   Department     Provider  --------------------------------------------------------------------------------                                EP -                              PRIMARY      [facility]  Last Visit: 01-      CARE ([facility])   ITALO Irizarry                              EP -                              PRIMARY      [facility]  Next Visit: 03-      CARE ([facility])   MEDICINE       Tai Irizarry                                                            Last  Test          Frequency    Reason                     Performed    Due Date  --------------------------------------------------------------------------------    CBC.........  12 months..  meloxicam................  06- 05-    CMP.........  12 months..  alendronate, meloxicam...  06- 05-    Mg Level....  12 months..  alendronate..............  Not Found    Overdue    Phosphate...  12 months..  alendronate..............  Not Found    Overdue    [employer] Embedded Care Due Messages. Reference number: [id number].   3/05/2025 3:01:50 PM CST

## 2025-03-12 ENCOUNTER — OFFICE VISIT (OUTPATIENT)
Dept: FAMILY MEDICINE | Facility: CLINIC | Age: 70
End: 2025-03-12
Payer: MEDICARE

## 2025-03-12 VITALS
HEIGHT: 62 IN | SYSTOLIC BLOOD PRESSURE: 118 MMHG | DIASTOLIC BLOOD PRESSURE: 78 MMHG | WEIGHT: 116.88 LBS | BODY MASS INDEX: 21.51 KG/M2 | HEART RATE: 92 BPM

## 2025-03-12 DIAGNOSIS — J43.2 CENTRILOBULAR EMPHYSEMA: ICD-10-CM

## 2025-03-12 DIAGNOSIS — Z12.31 ENCOUNTER FOR SCREENING MAMMOGRAM FOR MALIGNANT NEOPLASM OF BREAST: ICD-10-CM

## 2025-03-12 DIAGNOSIS — Z12.39 ENCOUNTER FOR SCREENING FOR MALIGNANT NEOPLASM OF BREAST, UNSPECIFIED SCREENING MODALITY: Primary | ICD-10-CM

## 2025-03-12 DIAGNOSIS — M81.0 OSTEOPOROSIS WITHOUT CURRENT PATHOLOGICAL FRACTURE, UNSPECIFIED OSTEOPOROSIS TYPE: ICD-10-CM

## 2025-03-12 PROCEDURE — 1125F AMNT PAIN NOTED PAIN PRSNT: CPT | Mod: CPTII,S$GLB,, | Performed by: STUDENT IN AN ORGANIZED HEALTH CARE EDUCATION/TRAINING PROGRAM

## 2025-03-12 PROCEDURE — 99214 OFFICE O/P EST MOD 30 MIN: CPT | Mod: S$GLB,,, | Performed by: STUDENT IN AN ORGANIZED HEALTH CARE EDUCATION/TRAINING PROGRAM

## 2025-03-12 PROCEDURE — 3288F FALL RISK ASSESSMENT DOCD: CPT | Mod: CPTII,S$GLB,, | Performed by: STUDENT IN AN ORGANIZED HEALTH CARE EDUCATION/TRAINING PROGRAM

## 2025-03-12 PROCEDURE — G2211 COMPLEX E/M VISIT ADD ON: HCPCS | Mod: S$GLB,,, | Performed by: STUDENT IN AN ORGANIZED HEALTH CARE EDUCATION/TRAINING PROGRAM

## 2025-03-12 PROCEDURE — 3008F BODY MASS INDEX DOCD: CPT | Mod: CPTII,S$GLB,, | Performed by: STUDENT IN AN ORGANIZED HEALTH CARE EDUCATION/TRAINING PROGRAM

## 2025-03-12 PROCEDURE — 3078F DIAST BP <80 MM HG: CPT | Mod: CPTII,S$GLB,, | Performed by: STUDENT IN AN ORGANIZED HEALTH CARE EDUCATION/TRAINING PROGRAM

## 2025-03-12 PROCEDURE — 1101F PT FALLS ASSESS-DOCD LE1/YR: CPT | Mod: CPTII,S$GLB,, | Performed by: STUDENT IN AN ORGANIZED HEALTH CARE EDUCATION/TRAINING PROGRAM

## 2025-03-12 PROCEDURE — 3074F SYST BP LT 130 MM HG: CPT | Mod: CPTII,S$GLB,, | Performed by: STUDENT IN AN ORGANIZED HEALTH CARE EDUCATION/TRAINING PROGRAM

## 2025-03-12 PROCEDURE — 1159F MED LIST DOCD IN RCRD: CPT | Mod: CPTII,S$GLB,, | Performed by: STUDENT IN AN ORGANIZED HEALTH CARE EDUCATION/TRAINING PROGRAM

## 2025-03-12 RX ORDER — TIZANIDINE 2 MG/1
2 TABLET ORAL EVERY 8 HOURS
Qty: 270 TABLET | Refills: 1 | Status: SHIPPED | OUTPATIENT
Start: 2025-03-12

## 2025-03-12 RX ORDER — ALBUTEROL SULFATE 90 UG/1
2 INHALANT RESPIRATORY (INHALATION) EVERY 4 HOURS PRN
Qty: 52 G | Refills: 5 | Status: SHIPPED | OUTPATIENT
Start: 2025-03-12

## 2025-03-12 RX ORDER — ALENDRONATE SODIUM 70 MG/1
70 TABLET ORAL
Qty: 12 TABLET | Refills: 1 | Status: SHIPPED | OUTPATIENT
Start: 2025-03-12 | End: 2025-06-10

## 2025-03-12 NOTE — PROGRESS NOTES
"  [facility]    [facility]  [address]  [place] [address]    Subjective     Patient ID: Jing Spears is a 69 y.o. female who comes to the clinic for a follow up visit.    Chief Complaint: Health Maintenance    Osteoporosis - takes fosfamax     COPD - uses albuterol inhaler nightly, still smokes     HTN - takes metoprolol succinate     Chronic back pain - takes OTC medication to control this     Tobacco abuse - smokes at least 1PPD    ROS negative unless stated above       Objective     Vitals:    03/12/25 1147   BP: 118/78   BP Location: Left arm   Patient Position: Sitting   Pulse: 92   Weight: 53 kg (116 lb 14.4 oz)   Height: 5' 2" (1.575 m)        Wt Readings from Last 3 Encounters:   03/12/25 1147 53 kg (116 lb 14.4 oz)   01/29/25 1131 52.6 kg (116 lb)   10/02/24 1138 53.5 kg (118 lb)        Physical Exam  Constitutional:       General: She is not in acute distress.     Appearance: Normal appearance. She is not ill-appearing.   HENT:      Head: Normocephalic and atraumatic.      Mouth/Throat:      Mouth: Mucous membranes are moist.   Eyes:      Extraocular Movements: Extraocular movements intact.      Pupils: Pupils are equal, round, and reactive to light.   Cardiovascular:      Rate and Rhythm: Normal rate.   Pulmonary:      Effort: Pulmonary effort is normal. No respiratory distress.   Musculoskeletal:         General: Normal range of motion.      Cervical back: Normal range of motion and neck supple.   Skin:     General: Skin is warm and dry.   Neurological:      General: No focal deficit present.      Mental Status: She is alert and oriented to person, place, and time. Mental status is at baseline.   Psychiatric:         Mood and Affect: Mood normal.         Behavior: Behavior normal.         Thought Content: Thought content normal.         Current Outpatient Medications   Medication Instructions    albuterol (PROVENTIL/VENTOLIN HFA) 90 " mcg/actuation inhaler 2 puffs, Inhalation, Every 4 hours PRN    alendronate (FOSAMAX) 70 mg, Oral, Every 7 days    budesonide (RINOCORT AQUA) 32 mcg/actuation nasal spray 1 spray    fluticasone propionate (FLONASE) 50 mcg/actuation nasal spray 2 sprays    ipratropium (ATROVENT) 21 mcg (0.03 %) nasal spray 2 sprays    levocetirizine (XYZAL) 5 mg    metoprolol succinate (TOPROL-XL) 25 mg    tiZANidine (ZANAFLEX) 2 mg, Oral, Every 8 hours           Assessment and Plan     1. Encounter for screening for malignant neoplasm of breast, unspecified screening modality  -     Mammo Digital Screening Bilat w/ Eduard (XPD); Future; Expected date: 03/12/2025    2. Osteoporosis without current pathological fracture, unspecified osteoporosis type  -     alendronate (FOSAMAX) 70 MG tablet; Take 1 tablet (70 mg total) by mouth every 7 days.  Dispense: 12 tablet; Refill: 1    3. Centrilobular emphysema  -     albuterol (PROVENTIL/VENTOLIN HFA) 90 mcg/actuation inhaler; Inhale 2 puffs into the lungs every 4 (four) hours as needed for Wheezing.  Dispense: 52 g; Refill: 5    4. Encounter for screening mammogram for malignant neoplasm of breast  -     Mammo Digital Screening Bilat w/ Eduard (XPD); Future; Expected date: 03/12/2025    Other orders  -     tiZANidine (ZANAFLEX) 2 MG tablet; Take 1 tablet (2 mg total) by mouth every 8 (eight) hours.  Dispense: 270 tablet; Refill: 1        Here for follow up.    For breast cancer screening, she has finally agreed to get a mammogram.  [facility] will order this at [facility]    For hypertension, continue metoprolol    For osteoporosis, continue Fosamax    The visit today included increased complexity associated with the care of an episodic problem, namely HTN, that was addressed and managed via longitudinal care.    RTC in 6 months, mammo, regular        I encouraged the patient to take all medications as prescribed and to keep follow up appointments with their providers. ED precautions given for more  concerning issues. Questions were invited and answered. Patient stated they had no other concerns. Follow up sooner if needed.     Tai Irizarry MD  03/12/2025 1:24 PM

## 2025-03-14 ENCOUNTER — TELEPHONE (OUTPATIENT)
Dept: FAMILY MEDICINE | Facility: CLINIC | Age: 70
End: 2025-03-14
Payer: MEDICARE

## 2025-03-14 NOTE — TELEPHONE ENCOUNTER
----- Message from Yun sent at 3/14/2025 12:47 PM CDT -----  Contact: self  Pt is req you fax over her order to have her mammo to [facility] in [place] and then call pt back after sending at [phone] and thanks

## 2025-03-14 NOTE — TELEPHONE ENCOUNTER
Spoke with Mrs Spears assured her I faxed her order for Mammo to [facility] and I called [facility] to assure they received it.

## 2025-03-18 ENCOUNTER — TELEPHONE (OUTPATIENT)
Dept: FAMILY MEDICINE | Facility: CLINIC | Age: 70
End: 2025-03-18
Payer: MEDICARE

## 2025-03-18 NOTE — TELEPHONE ENCOUNTER
----- Message from REH sent at 3/18/2025 12:23 PM CDT -----  Type:  Needs Medical AdviceWho Called: the patientSymptoms (please be specific):How long has patient had these symptoms:  Pharmacy name and phone #:  Would the patient rather a call back or a response via [facility]? call [facility] Call Back Number: [phone] Additional Information: Pt states she would like to speak to nursePlease adviseThanks

## 2025-05-02 ENCOUNTER — OFFICE VISIT (OUTPATIENT)
Dept: FAMILY MEDICINE | Facility: CLINIC | Age: 70
End: 2025-05-02
Payer: MEDICARE

## 2025-05-02 VITALS
HEIGHT: 62 IN | HEART RATE: 112 BPM | BODY MASS INDEX: 20.98 KG/M2 | SYSTOLIC BLOOD PRESSURE: 120 MMHG | OXYGEN SATURATION: 99 % | DIASTOLIC BLOOD PRESSURE: 70 MMHG | RESPIRATION RATE: 18 BRPM | WEIGHT: 114 LBS

## 2025-05-02 DIAGNOSIS — Z09 HOSPITAL DISCHARGE FOLLOW-UP: ICD-10-CM

## 2025-05-02 DIAGNOSIS — M81.0 OSTEOPOROSIS WITHOUT CURRENT PATHOLOGICAL FRACTURE, UNSPECIFIED OSTEOPOROSIS TYPE: ICD-10-CM

## 2025-05-02 DIAGNOSIS — F17.200 TOBACCO USE DISORDER: ICD-10-CM

## 2025-05-02 DIAGNOSIS — I10 PRIMARY HYPERTENSION: ICD-10-CM

## 2025-05-02 DIAGNOSIS — Z86.73 HISTORY OF CVA (CEREBROVASCULAR ACCIDENT): Primary | ICD-10-CM

## 2025-05-02 RX ORDER — IBUPROFEN 200 MG
1 TABLET ORAL DAILY
Qty: 28 PATCH | Refills: 6 | Status: SHIPPED | OUTPATIENT
Start: 2025-05-02

## 2025-05-02 RX ORDER — FOLIC ACID 1 MG/1
1 TABLET ORAL
COMMUNITY
Start: 2025-04-17

## 2025-05-02 RX ORDER — DOCUSATE SODIUM 100 MG/1
100 CAPSULE, LIQUID FILLED ORAL
COMMUNITY
Start: 2025-04-17

## 2025-05-02 RX ORDER — DICLOFENAC SODIUM 10 MG/G
GEL TOPICAL
COMMUNITY
Start: 2025-04-17

## 2025-05-02 RX ORDER — METHOCARBAMOL 500 MG/1
500 TABLET, FILM COATED ORAL
COMMUNITY
Start: 2025-04-17

## 2025-05-02 RX ORDER — ATORVASTATIN CALCIUM 40 MG/1
40 TABLET, FILM COATED ORAL
COMMUNITY
Start: 2025-04-17 | End: 2025-05-02 | Stop reason: SDUPTHER

## 2025-05-02 RX ORDER — AMLODIPINE BESYLATE 2.5 MG/1
2.5 TABLET ORAL DAILY
Qty: 90 TABLET | Refills: 1 | Status: SHIPPED | OUTPATIENT
Start: 2025-05-02

## 2025-05-02 RX ORDER — ALBUTEROL SULFATE 90 UG/1
2 INHALANT RESPIRATORY (INHALATION)
COMMUNITY
Start: 2025-04-17

## 2025-05-02 RX ORDER — ASPIRIN 325 MG
325 TABLET ORAL
COMMUNITY
Start: 2025-04-17 | End: 2025-05-02

## 2025-05-02 RX ORDER — AMLODIPINE BESYLATE 2.5 MG/1
2.5 TABLET ORAL
COMMUNITY
Start: 2025-04-17 | End: 2025-05-02 | Stop reason: SDUPTHER

## 2025-05-02 RX ORDER — ASPIRIN 325 MG/1
100 TABLET, FILM COATED ORAL
COMMUNITY
Start: 2025-04-17 | End: 2025-05-02

## 2025-05-02 RX ORDER — ASPIRIN 81 MG/1
81 TABLET ORAL DAILY
Qty: 90 TABLET | Refills: 3 | Status: SHIPPED | OUTPATIENT
Start: 2025-05-02 | End: 2026-05-02

## 2025-05-02 RX ORDER — ALENDRONATE SODIUM 70 MG/1
70 TABLET ORAL
Qty: 12 TABLET | Refills: 1 | Status: SHIPPED | OUTPATIENT
Start: 2025-05-02 | End: 2025-07-31

## 2025-05-02 RX ORDER — ATORVASTATIN CALCIUM 40 MG/1
40 TABLET, FILM COATED ORAL DAILY
Qty: 90 TABLET | Refills: 1 | Status: SHIPPED | OUTPATIENT
Start: 2025-05-02

## 2025-05-02 NOTE — PROGRESS NOTES
"  [facility]  [address], [address]  [place] [address]    Subjective     Patient ID: Jing Spears is a 69 y.o. female who comes to the clinic for a follow up visit.    Chief Complaint: managment of chronic condition (Rehab follow up)    Hospital follow up - patient went to the [facility] ED on 04/12 for new right-sided weakness.  Imaging showed an acute left thalamic infarct.  She was started on PT, OT, and speech therapy.  She was hospitalized for about 5 days before being discharged to a SNF. She was there for two weeks.    Today, she is doing mildly better but still has significant right-sided weakness. She is still smoking.     ROS negative unless stated above       Objective     Vitals:    05/02/25 0937   BP: 120/70   BP Location: Right arm   Patient Position: Sitting   Pulse: (!) 112   Resp: 18   SpO2: 99%   Weight: 51.7 kg (114 lb)   Height: 5' 2" (1.575 m)        Wt Readings from Last 3 Encounters:   05/02/25 0937 51.7 kg (114 lb)   03/12/25 1147 53 kg (116 lb 14.4 oz)   01/29/25 1131 52.6 kg (116 lb)        Physical Exam  Constitutional:       General: She is not in acute distress.     Appearance: Normal appearance. She is not ill-appearing.   HENT:      Head: Normocephalic and atraumatic.      Mouth/Throat:      Mouth: Mucous membranes are moist.   Eyes:      Extraocular Movements: Extraocular movements intact.      Pupils: Pupils are equal, round, and reactive to light.   Cardiovascular:      Rate and Rhythm: Normal rate.   Pulmonary:      Effort: Pulmonary effort is normal. No respiratory distress.   Musculoskeletal:         General: Normal range of motion.      Cervical back: Normal range of motion and neck supple.   Skin:     General: Skin is warm and dry.   Neurological:      General: No focal deficit present.      Mental Status: She is alert and oriented to person, place, and time. Mental status is at baseline.      Sensory: " Sensory deficit present.      Motor: Weakness present.      Coordination: Coordination abnormal.   Psychiatric:         Mood and Affect: Mood normal.         Behavior: Behavior normal.         Thought Content: Thought content normal.         Current Outpatient Medications   Medication Instructions    albuterol (PROVENTIL/VENTOLIN HFA) 90 mcg/actuation inhaler 2 puffs, Inhalation, Every 4 hours PRN    albuterol (PROVENTIL/VENTOLIN HFA) 90 mcg/actuation inhaler 2 puffs    alendronate (FOSAMAX) 70 mg, Oral, Every 7 days    amLODIPine (NORVASC) 2.5 mg, Oral, Daily    aspirin (ECOTRIN) 81 mg, Oral, Daily    atorvastatin (LIPITOR) 40 mg, Oral, Daily    budesonide (RINOCORT AQUA) 32 mcg/actuation nasal spray 1 spray    camphor-methyl salicyl-menthoL 4-30-10 % Crea 1 g    diclofenac sodium (VOLTAREN ARTHRITIS PAIN) 1 % Gel Apply topically.    docusate sodium (COLACE) 100 mg    fluticasone propionate (FLONASE) 50 mcg/actuation nasal spray 2 sprays    folic acid (FOLVITE) 1 mg    ipratropium (ATROVENT) 21 mcg (0.03 %) nasal spray 2 sprays    levocetirizine (XYZAL) 5 mg    methocarbamoL (ROBAXIN) 500 mg    metoprolol succinate (TOPROL-XL) 25 mg    nicotine (NICODERM CQ) 21 mg/24 hr 1 patch, Transdermal, Daily           Assessment and Plan     1. History of CVA (cerebrovascular accident)  -     Ambulatory referral/consult to Home Health; Future; Expected date: 05/03/2025  -     atorvastatin (LIPITOR) 40 MG tablet; Take 1 tablet (40 mg total) by mouth once daily.  Dispense: 90 tablet; Refill: 1  -     aspirin (ECOTRIN) 81 MG EC tablet; Take 1 tablet (81 mg total) by mouth once daily.  Dispense: 90 tablet; Refill: 3    2. Hospital discharge follow-up    3. Tobacco use disorder  -     nicotine (NICODERM CQ) 21 mg/24 hr; Place 1 patch onto the skin once daily.  Dispense: 28 patch; Refill: 6    4. Primary hypertension  -     amLODIPine (NORVASC) 2.5 MG tablet; Take 1 tablet (2.5 mg total) by mouth once daily.  Dispense: 90 tablet;  Refill: 1    5. Osteoporosis without current pathological fracture, unspecified osteoporosis type  -     alendronate (FOSAMAX) 70 MG tablet; Take 1 tablet (70 mg total) by mouth every 7 days.  Dispense: 12 tablet; Refill: 1      Here for follow up.    For CVA, continue lipitor, stop ASA 325mg, start baby aspirin, ordering Home PT and OT    For HTN, continue amlodipine 2.5mg daily    For osteoporosis, continue fosfamax    The visit today included increased complexity associated with the care of an episodic problem, namely HTN, that was addressed and managed via longitudinal care.    RTC in 1 month        I encouraged the patient to take all medications as prescribed and to keep follow up appointments with their providers. ED precautions given for more concerning issues. Questions were invited and answered. Patient stated they had no other concerns. Follow up sooner if needed.     Tai Irizarry MD  05/02/2025 9:41 AM

## 2025-05-06 ENCOUNTER — TELEPHONE (OUTPATIENT)
Dept: FAMILY MEDICINE | Facility: CLINIC | Age: 70
End: 2025-05-06
Payer: MEDICARE

## 2025-05-06 ENCOUNTER — TELEPHONE (OUTPATIENT)
Dept: MATERNAL FETAL MEDICINE | Facility: CLINIC | Age: 70
End: 2025-05-06
Payer: MEDICARE

## 2025-05-06 NOTE — TELEPHONE ENCOUNTER
----- Message from Jolie sent at 5/6/2025 11:09 AM CDT -----  Contact: Mitra  Type: Needs Medical AdviceWho Called: Mitra/ Renato Good Call Back Number: [phone], FAX# [phone]  Information: Requesting to get most recent office  visit notes within last 90  days. Also diagnosis unable to use ICD  10 code on referral Please Advise- Thank you

## 2025-05-07 ENCOUNTER — TELEPHONE (OUTPATIENT)
Dept: FAMILY MEDICINE | Facility: CLINIC | Age: 70
End: 2025-05-07
Payer: MEDICARE

## 2025-05-07 NOTE — TELEPHONE ENCOUNTER
Angela Tanner,  Please review this message below from this patient.  Calling for status update on referral that was sent to [facility].  Thank you.  Ken Georges LPN  ----- Message from Watson Tanner sent at 5/7/2025 11:53 AM CDT -----  Type:  Patient Requesting Referral Who Called:BLAYNE GANDARA [facility] DEPT Does the patient already have the specialty appointment scheduled?:NA If yes, what is the date of that appointment?:NA Referral to What Specialty:[facility] Reason for Referral:HISTORY OF CVA Does the patient want the referral with a specific physician?:NA [facility] the specialist [facility] Patient Requesting a Response?:YES Would the patient rather a call back or a response via [facility]? CALL BACK Best Call Back Number: [phone] Additional Information:REP CISNEROS CALLED TO CHECK STATUS OF THE REFERRAL ALREADY WRITTEN; SHE SAID [facility] HASN'T RECEIVED THE REFERRAL YET; SHE IS ALSO REQUESTING OCCUPATIONAL THERAPY TO BE ADDED TO THE REFERRAL;     Please call Back to advise. Thanks!

## 2025-05-08 ENCOUNTER — TELEPHONE (OUTPATIENT)
Dept: FAMILY MEDICINE | Facility: CLINIC | Age: 70
End: 2025-05-08
Payer: MEDICARE

## 2025-05-08 NOTE — TELEPHONE ENCOUNTER
Faxed referral to [facility], the company contracted by [facility] for home health in [place], called & notified pt.

## 2025-05-08 NOTE — TELEPHONE ENCOUNTER
----- Message from Moni sent at 5/8/2025 12:21 PM CDT -----  Contact: Mitra erwin [facility]  Type: Needs Medical AdviceWho Called:  Mitra monreal. [facility] Call Back Number: [phone] / fax # [phone] Information: Mitra is calling back in regards to the face to face for the clinical notes they received from the office the one they received was dated back in January and they need it dated within 90 days and needs it by the latest dated for 02/13, can we please check on this and call back to discuss. Thank You

## 2025-05-09 PROCEDURE — G0180 MD CERTIFICATION HHA PATIENT: HCPCS | Mod: ,,, | Performed by: STUDENT IN AN ORGANIZED HEALTH CARE EDUCATION/TRAINING PROGRAM

## 2025-06-06 ENCOUNTER — OFFICE VISIT (OUTPATIENT)
Dept: FAMILY MEDICINE | Facility: CLINIC | Age: 70
End: 2025-06-06
Payer: MEDICARE

## 2025-06-06 VITALS
HEIGHT: 62 IN | OXYGEN SATURATION: 98 % | SYSTOLIC BLOOD PRESSURE: 124 MMHG | HEART RATE: 91 BPM | DIASTOLIC BLOOD PRESSURE: 78 MMHG | BODY MASS INDEX: 21.67 KG/M2 | WEIGHT: 117.75 LBS

## 2025-06-06 DIAGNOSIS — I10 PRIMARY HYPERTENSION: Primary | ICD-10-CM

## 2025-06-06 DIAGNOSIS — Z12.31 ENCOUNTER FOR SCREENING MAMMOGRAM FOR BREAST CANCER: ICD-10-CM

## 2025-06-06 DIAGNOSIS — G47.00 INSOMNIA, UNSPECIFIED TYPE: ICD-10-CM

## 2025-06-06 DIAGNOSIS — M19.049 ARTHRITIS OF HAND, UNSPECIFIED LATERALITY: ICD-10-CM

## 2025-06-06 DIAGNOSIS — J43.2 CENTRILOBULAR EMPHYSEMA: ICD-10-CM

## 2025-06-06 DIAGNOSIS — R53.81 PHYSICAL DECONDITIONING: ICD-10-CM

## 2025-06-06 DIAGNOSIS — Z86.73 HISTORY OF CVA (CEREBROVASCULAR ACCIDENT): ICD-10-CM

## 2025-06-06 RX ORDER — TRAZODONE HYDROCHLORIDE 100 MG/1
100 TABLET ORAL NIGHTLY
Qty: 90 TABLET | Refills: 3 | Status: SHIPPED | OUTPATIENT
Start: 2025-06-06 | End: 2026-06-06

## 2025-06-06 RX ORDER — DICLOFENAC SODIUM 10 MG/G
2 GEL TOPICAL DAILY
Qty: 50 G | Refills: 5 | Status: SHIPPED | OUTPATIENT
Start: 2025-06-06

## 2025-06-25 ENCOUNTER — PATIENT OUTREACH (OUTPATIENT)
Dept: ADMINISTRATIVE | Facility: HOSPITAL | Age: 70
End: 2025-06-25
Payer: MEDICARE

## 2025-06-25 DIAGNOSIS — Z78.0 POSTMENOPAUSAL: Primary | ICD-10-CM

## 2025-06-25 NOTE — PROGRESS NOTES
Care Coordination Encounter Details:       MyChart Portal Status:         [x]  Reviewed MyChart Portal Status offered / enrolled if applicable        Additional Notes:     MyChart Outcomes: Pt declined         Updates Requested / Reviewed:        Updated Care Coordination Note, Care Everywhere, , and Immunizations Reconciliation Completed or Queried: [place]         Health Maintenance Screening(s) Due:      Health Maintenance Topics Overdue:      VBHM Score: 2     Osteoporosis Screening  Mammogram    Tetanus Vaccine  Shingles/Zoster Vaccine                  Health Maintenance Topic(s) Outreach Outcomes & Actions Taken:    Breast Cancer Screening - Outreach Outcomes & Actions Taken  : Pt Will Schedule with External Provider / Order Routed & Care Team Updated if Applicable - Scheduled at [facility] on 07.11.25    Osteoporosis Screening - Outreach Outcomes & Actions Taken  : Patient Will Schedule with External Provider / Order Routed & Care Team Updated if Applicable             Chronic Disease Management:     Diabetes Measures        Lab Results   Component Value Date    HGBA1C 4.7 04/12/2025           []  Reviewed chart for active Diabetes diagnosis     []  Scheduled necessary follow up appointments if needed         Additional Notes:  No Hx of Diabetes           Hypertension Measures        BP Readings from Last 1 Encounters:   06/06/25 124/78           [x]  Reviewed chart for active Hypertension diagnosis     [x]  Reviewed & documented Home BP Cuff     []  Documented a Remote BP if needed & applicable     []  Scheduled necessary follow up appointments with Primary Care if needed               Provider Team Continuity:     Last PCP Visit Date: 6/6/2025          [x]  Reviewed Primary Care Provider Visits, Annual Wellness Visit, and Future          Appointments to ensure appointments have been scheduled and/or           completed              Social Determinants of Health          [x]  Reviewed,  completed, and/or updated the following sections:                  Food Insecurity, Transportation Needs, Financial Resource Strain,                 Tobacco Use              Care Management, Digital Medicine, and/or Education Referrals    OPCM Risk Score: 35.6         Next Steps - Referral Actions: Digital Medicine Outcomes and Actions Taken: Pt Declined or Not Eligible

## 2025-06-26 ENCOUNTER — TELEPHONE (OUTPATIENT)
Dept: FAMILY MEDICINE | Facility: CLINIC | Age: 70
End: 2025-06-26
Payer: MEDICARE

## 2025-06-26 DIAGNOSIS — M62.838 MUSCLE SPASM: Primary | ICD-10-CM

## 2025-06-26 NOTE — TELEPHONE ENCOUNTER
Angela Irizarry,  Please review this new Rx request from this patient. Requesting new Rx for the Tizanidine 2 mg. Patient previously did take this Rx until it was discontinued on 5/2/25.  Last office visit: 6/6/25  Please review.  Ken Georges LPN  ----- Message from Melba sent at 6/26/2025 11:32 AM CDT -----  Contact: Patient  Type:  RX Refill Request    Who Called: Patient    Refill or New Rx:refill     RX Name and Strength:Tizanidine 2mg tablets for muscle spasms   ( not listed)    How is the patient currently taking it? (ex. 1XDay):  1 x day     Is this a 30 day or 90 day RX:90    Preferred Pharmacy with phone number:  [id number] - [place], [place] - [address]  [address]  [place] [address]  Phone: [phone] Fax: [phone]      Local or Mail Order:Local     Ordering Provider:Juan C     Would the patient rather a call back or a response via [facility]? Call    Best Call Back Number:[phone]    Additional Information: Please call to advise

## 2025-06-27 RX ORDER — TIZANIDINE 2 MG/1
4 TABLET ORAL EVERY 8 HOURS PRN
Qty: 90 TABLET | Refills: 1 | Status: SHIPPED | OUTPATIENT
Start: 2025-06-27 | End: 2025-07-07

## 2025-07-07 ENCOUNTER — TELEPHONE (OUTPATIENT)
Dept: FAMILY MEDICINE | Facility: CLINIC | Age: 70
End: 2025-07-07
Payer: MEDICARE

## 2025-07-08 ENCOUNTER — OFFICE VISIT (OUTPATIENT)
Dept: FAMILY MEDICINE | Facility: CLINIC | Age: 70
End: 2025-07-08
Payer: MEDICARE

## 2025-07-08 VITALS
SYSTOLIC BLOOD PRESSURE: 92 MMHG | DIASTOLIC BLOOD PRESSURE: 60 MMHG | RESPIRATION RATE: 18 BRPM | HEART RATE: 107 BPM | BODY MASS INDEX: 21.22 KG/M2 | OXYGEN SATURATION: 97 % | WEIGHT: 115.31 LBS | HEIGHT: 62 IN

## 2025-07-08 DIAGNOSIS — R68.81 EARLY SATIETY: ICD-10-CM

## 2025-07-08 DIAGNOSIS — R42 DIZZINESS: Primary | ICD-10-CM

## 2025-07-08 PROCEDURE — G2211 COMPLEX E/M VISIT ADD ON: HCPCS | Mod: S$GLB,,, | Performed by: STUDENT IN AN ORGANIZED HEALTH CARE EDUCATION/TRAINING PROGRAM

## 2025-07-08 PROCEDURE — 1159F MED LIST DOCD IN RCRD: CPT | Mod: CPTII,S$GLB,, | Performed by: STUDENT IN AN ORGANIZED HEALTH CARE EDUCATION/TRAINING PROGRAM

## 2025-07-08 PROCEDURE — 1126F AMNT PAIN NOTED NONE PRSNT: CPT | Mod: CPTII,S$GLB,, | Performed by: STUDENT IN AN ORGANIZED HEALTH CARE EDUCATION/TRAINING PROGRAM

## 2025-07-08 PROCEDURE — 1101F PT FALLS ASSESS-DOCD LE1/YR: CPT | Mod: CPTII,S$GLB,, | Performed by: STUDENT IN AN ORGANIZED HEALTH CARE EDUCATION/TRAINING PROGRAM

## 2025-07-08 PROCEDURE — 3074F SYST BP LT 130 MM HG: CPT | Mod: CPTII,S$GLB,, | Performed by: STUDENT IN AN ORGANIZED HEALTH CARE EDUCATION/TRAINING PROGRAM

## 2025-07-08 PROCEDURE — 84443 ASSAY THYROID STIM HORMONE: CPT | Performed by: STUDENT IN AN ORGANIZED HEALTH CARE EDUCATION/TRAINING PROGRAM

## 2025-07-08 PROCEDURE — 80048 BASIC METABOLIC PNL TOTAL CA: CPT | Performed by: STUDENT IN AN ORGANIZED HEALTH CARE EDUCATION/TRAINING PROGRAM

## 2025-07-08 PROCEDURE — 3008F BODY MASS INDEX DOCD: CPT | Mod: CPTII,S$GLB,, | Performed by: STUDENT IN AN ORGANIZED HEALTH CARE EDUCATION/TRAINING PROGRAM

## 2025-07-08 PROCEDURE — 3078F DIAST BP <80 MM HG: CPT | Mod: CPTII,S$GLB,, | Performed by: STUDENT IN AN ORGANIZED HEALTH CARE EDUCATION/TRAINING PROGRAM

## 2025-07-08 PROCEDURE — 3288F FALL RISK ASSESSMENT DOCD: CPT | Mod: CPTII,S$GLB,, | Performed by: STUDENT IN AN ORGANIZED HEALTH CARE EDUCATION/TRAINING PROGRAM

## 2025-07-08 PROCEDURE — 85025 COMPLETE CBC W/AUTO DIFF WBC: CPT | Performed by: STUDENT IN AN ORGANIZED HEALTH CARE EDUCATION/TRAINING PROGRAM

## 2025-07-08 PROCEDURE — 99214 OFFICE O/P EST MOD 30 MIN: CPT | Mod: S$GLB,,, | Performed by: STUDENT IN AN ORGANIZED HEALTH CARE EDUCATION/TRAINING PROGRAM

## 2025-07-08 PROCEDURE — 3044F HG A1C LEVEL LT 7.0%: CPT | Mod: CPTII,S$GLB,, | Performed by: STUDENT IN AN ORGANIZED HEALTH CARE EDUCATION/TRAINING PROGRAM

## 2025-07-08 RX ORDER — MECLIZINE HYDROCHLORIDE 25 MG/1
25 TABLET ORAL 3 TIMES DAILY PRN
Qty: 30 TABLET | Refills: 0 | Status: SHIPPED | OUTPATIENT
Start: 2025-07-08

## 2025-07-08 NOTE — PROGRESS NOTES
"  [facility]    [facility]  [address]  [place] [address]    Subjective     Patient ID: Jing Spears is a 69 y.o. female who comes to the clinic for a follow up visit.    Chief Complaint: Dizziness (Patient c/o dizziness weakness and high BP x weeks)    Dizziness - started two weeks ago, no vertigo, can happen at random. Has caused her to get nauseated which is causing her to have decreased appetite and weakness.     History of CVA - occurred in April 2025, on baby asa and statin     Osteoporosis - takes fosfamax     COPD - uses albuterol inhaler nightly     HTN - takes amlodipine 2.5mg daily     Chronic back pain - takes OTC medication to control this     Tobacco abuse - stopped smoking as of May 2025    ROS negative unless stated above       Objective     Vitals:    07/08/25 0930   BP: 92/60   Patient Position: Sitting   Pulse: 107   Resp: 18   SpO2: 97%   Weight: 52.3 kg (115 lb 4.8 oz)   Height: 5' 2" (1.575 m)        Wt Readings from Last 3 Encounters:   07/08/25 0930 52.3 kg (115 lb 4.8 oz)   06/06/25 1132 53.4 kg (117 lb 11.6 oz)   05/02/25 0937 51.7 kg (114 lb)        Physical Exam  Constitutional:       General: She is not in acute distress.     Appearance: Normal appearance. She is not ill-appearing.   HENT:      Head: Normocephalic and atraumatic.      Mouth/Throat:      Mouth: Mucous membranes are moist.   Eyes:      Extraocular Movements: Extraocular movements intact.      Pupils: Pupils are equal, round, and reactive to light.   Cardiovascular:      Rate and Rhythm: Normal rate.   Pulmonary:      Effort: Pulmonary effort is normal. No respiratory distress.   Musculoskeletal:         General: Normal range of motion.      Cervical back: Normal range of motion and neck supple.   Skin:     General: Skin is warm and dry.   Neurological:      Mental Status: She is alert and oriented to person, place, and time. Mental status is at baseline. "      Sensory: Sensory deficit present.      Motor: Weakness present.      Coordination: Coordination abnormal.   Psychiatric:         Mood and Affect: Mood normal.         Behavior: Behavior normal.         Thought Content: Thought content normal.         Current Outpatient Medications   Medication Instructions    albuterol (PROVENTIL/VENTOLIN HFA) 90 mcg/actuation inhaler 2 puffs, Inhalation, Every 4 hours PRN    albuterol (PROVENTIL/VENTOLIN HFA) 90 mcg/actuation inhaler 2 puffs    alendronate (FOSAMAX) 70 mg, Oral, Every 7 days    amLODIPine (NORVASC) 2.5 mg, Oral, Daily    aspirin (ECOTRIN) 81 mg, Oral, Daily    atorvastatin (LIPITOR) 40 mg, Oral, Daily    budesonide (RINOCORT AQUA) 32 mcg/actuation nasal spray 1 spray    diclofenac sodium (VOLTAREN ARTHRITIS PAIN) 2 g, Topical (Top), Daily    fluticasone propionate (FLONASE) 50 mcg/actuation nasal spray 2 sprays    folic acid (FOLVITE) 1 mg    ipratropium (ATROVENT) 21 mcg (0.03 %) nasal spray 2 sprays    levocetirizine (XYZAL) 5 mg    meclizine (ANTIVERT) 25 mg, Oral, 3 times daily PRN           Assessment and Plan     1. Dizziness  -     meclizine (ANTIVERT) 25 mg tablet; Take 1 tablet (25 mg total) by mouth 3 (three) times daily as needed for Dizziness.  Dispense: 30 tablet; Refill: 0  -     Basic Metabolic Panel; Future  -     CBC auto differential; Future; Expected date: 07/08/2025  -     TSH; Future; Expected date: 07/08/2025    2. Early satiety  -     TSH; Future; Expected date: 07/08/2025        Here for follow up.    For dizziness, starting meclizine, getting labs as above, will get CT head in 10 days if not improving    For HTN, continue amlodipine but will hold it on days where she's less than 100 systolic, like today    For COPD, continue asthma    The visit today included increased complexity associated with the care of an episodic problem, namely HTN, that was addressed and managed via longitudinal care.    RTC in 10 days        I encouraged the  patient to take all medications as prescribed and to keep follow up appointments with their providers. ED precautions given for more concerning issues. Questions were invited and answered. Patient stated they had no other concerns. Follow up sooner if needed.     Tai Irizarry MD  07/08/2025 9:36 AM

## 2025-07-10 ENCOUNTER — TELEPHONE (OUTPATIENT)
Dept: FAMILY MEDICINE | Facility: CLINIC | Age: 70
End: 2025-07-10
Payer: MEDICARE

## 2025-07-10 DIAGNOSIS — R42 DIZZINESS AND GIDDINESS: Primary | ICD-10-CM

## 2025-07-10 NOTE — TELEPHONE ENCOUNTER
Spoke with patient regarding wanting a CT brain ordered per their conversation in her visit would like it done at [facility] before her 2 wk f/u on 7/18/25

## 2025-07-11 ENCOUNTER — TELEPHONE (OUTPATIENT)
Dept: FAMILY MEDICINE | Facility: CLINIC | Age: 70
End: 2025-07-11
Payer: MEDICARE

## 2025-07-16 ENCOUNTER — TELEPHONE (OUTPATIENT)
Dept: ADMINISTRATIVE | Facility: HOSPITAL | Age: 70
End: 2025-07-16
Payer: MEDICARE

## 2025-07-16 NOTE — TELEPHONE ENCOUNTER
KALIN as a reminder to complete her Mammogram and Dexa scan at [facility]. Orders faxed on 06.06.25 & 06.25.25

## 2025-07-27 ENCOUNTER — EXTERNAL HOME HEALTH (OUTPATIENT)
Dept: HOME HEALTH SERVICES | Facility: HOSPITAL | Age: 70
End: 2025-07-27
Payer: MEDICARE

## 2025-08-08 ENCOUNTER — OFFICE VISIT (OUTPATIENT)
Dept: FAMILY MEDICINE | Facility: CLINIC | Age: 70
End: 2025-08-08
Payer: MEDICARE

## 2025-08-08 ENCOUNTER — TELEPHONE (OUTPATIENT)
Dept: FAMILY MEDICINE | Facility: CLINIC | Age: 70
End: 2025-08-08

## 2025-08-08 VITALS
BODY MASS INDEX: 20.75 KG/M2 | RESPIRATION RATE: 18 BRPM | HEART RATE: 115 BPM | DIASTOLIC BLOOD PRESSURE: 70 MMHG | SYSTOLIC BLOOD PRESSURE: 98 MMHG | WEIGHT: 112.75 LBS | OXYGEN SATURATION: 98 % | HEIGHT: 62 IN

## 2025-08-08 DIAGNOSIS — G47.00 INSOMNIA, UNSPECIFIED TYPE: ICD-10-CM

## 2025-08-08 DIAGNOSIS — M25.471 RIGHT ANKLE SWELLING: Primary | ICD-10-CM

## 2025-08-08 DIAGNOSIS — F40.240 CLAUSTROPHOBIA: ICD-10-CM

## 2025-08-08 RX ORDER — MIRTAZAPINE 7.5 MG/1
7.5 TABLET, FILM COATED ORAL NIGHTLY
Qty: 30 TABLET | Refills: 2 | Status: SHIPPED | OUTPATIENT
Start: 2025-08-08 | End: 2026-08-08

## 2025-08-08 RX ORDER — ALPRAZOLAM 0.5 MG/1
0.5 TABLET ORAL ONCE
Qty: 1 TABLET | Refills: 0 | Status: SHIPPED | OUTPATIENT
Start: 2025-08-08 | End: 2025-08-08

## 2025-08-08 NOTE — PROGRESS NOTES
"  [facility]    [facility]  [address]  [place] [address]    Subjective     Patient ID: Jing Spears is a 69 y.o. female who comes to the clinic for a follow up visit.    Chief Complaint: Health Maintenance (F/U.)    Right ankle swelling - started about two weeks ago, was originally both ankles but now it's just he right. No swelling up to the calf, no calf tenderness, no redness on the foot, no recent trauma. Elevated the foot doesn't help    Bilateral hand pain - not improving with Aleve    Insomnia - prior medicine (can not remember the name of it) was not working, I think it was trazodone, we will try another medicine    ROS negative unless stated above       Objective     Vitals:    08/08/25 0931   BP: 98/70   BP Location: Left arm   Pulse: (!) 115   Resp: 18   SpO2: 98%   Weight: 51.1 kg (112 lb 12.2 oz)   Height: 5' 2" (1.575 m)        Wt Readings from Last 3 Encounters:   08/08/25 0931 51.1 kg (112 lb 12.2 oz)   07/08/25 0930 52.3 kg (115 lb 4.8 oz)   06/06/25 1132 53.4 kg (117 lb 11.6 oz)        Physical Exam  Constitutional:       General: She is not in acute distress.     Appearance: Normal appearance. She is not ill-appearing.   HENT:      Head: Normocephalic and atraumatic.      Mouth/Throat:      Mouth: Mucous membranes are moist.   Eyes:      Extraocular Movements: Extraocular movements intact.      Pupils: Pupils are equal, round, and reactive to light.   Cardiovascular:      Rate and Rhythm: Normal rate.   Pulmonary:      Effort: Pulmonary effort is normal. No respiratory distress.   Musculoskeletal:         General: Normal range of motion.      Cervical back: Normal range of motion and neck supple.   Skin:     General: Skin is warm and dry.   Neurological:      General: No focal deficit present.      Mental Status: She is alert and oriented to person, place, and time. Mental status is at baseline.   Psychiatric:         Mood and " Affect: Mood normal.         Behavior: Behavior normal.         Thought Content: Thought content normal.         Current Outpatient Medications   Medication Instructions    albuterol (PROVENTIL/VENTOLIN HFA) 90 mcg/actuation inhaler 2 puffs, Inhalation, Every 4 hours PRN    alendronate (FOSAMAX) 70 mg, Oral, Every 7 days    ALPRAZolam (XANAX) 0.5 mg, Oral, Once    aspirin (ECOTRIN) 81 mg, Oral, Daily    atorvastatin (LIPITOR) 40 mg, Oral, Daily    diclofenac sodium (VOLTAREN ARTHRITIS PAIN) 2 g, Topical (Top), Daily    fluticasone propionate (FLONASE) 50 mcg/actuation nasal spray 2 sprays    folic acid (FOLVITE) 1 mg    levocetirizine (XYZAL) 5 mg    meclizine (ANTIVERT) 25 mg, Oral, 3 times daily PRN    mirtazapine (REMERON) 7.5 mg, Oral, Nightly           Assessment and Plan     1. Right ankle swelling    2. Insomnia, unspecified type  -     mirtazapine (REMERON) 7.5 MG Tab; Take 1 tablet (7.5 mg total) by mouth every evening.  Dispense: 30 tablet; Refill: 2    3. Claustrophobia  -     ALPRAZolam (XANAX) 0.5 MG tablet; Take 1 tablet (0.5 mg total) by mouth once. for 1 dose  Dispense: 1 tablet; Refill: 0        Here for follow up.    Patient has a CT scan later today, I will send in 1 Xanax to help her with her claustrophobia    For right foot swelling, she declined ankle x-ray and DVT ultrasound though I have low suspicion for DVT, we will try compression stockings in a leave, no signs of infection, no recent trauma     For insomnia, starting Remeron, this will also help with appetite    Patient said she can not afford a mammogram right now    The visit today included increased complexity associated with the care of an episodic problem, namely insomnia, that was addressed and managed via longitudinal care.    RTC in 3 months        I encouraged the patient to take all medications as prescribed and to keep follow up appointments with their providers. ED precautions given for more concerning issues. Questions were  invited and answered. Patient stated they had no other concerns. Follow up sooner if needed.     Tai Irizarry MD  08/08/2025 9:48 AM

## 2025-08-08 NOTE — TELEPHONE ENCOUNTER
Copied from CRM #[id number]. Topic: General Inquiry - Patient Advice  >> Aug 8, 2025  3:21 PM Moni wrote:  Type:  Test Results    Who Called:  Patient  Name of Test (Lab/Mammo/Etc):  CT Scan   Date of Test:  08/08  Ordering Provider:  Dr Irizarry  Where the test was performed:  [facility] Call Back Number:  [phone]  Additional Information:  Can we please check and see if results are back and if we can call pt to advise. Thank You

## 2025-08-11 ENCOUNTER — TELEPHONE (OUTPATIENT)
Dept: FAMILY MEDICINE | Facility: CLINIC | Age: 70
End: 2025-08-11
Payer: MEDICARE